# Patient Record
Sex: FEMALE | Race: WHITE | NOT HISPANIC OR LATINO | Employment: FULL TIME | URBAN - METROPOLITAN AREA
[De-identification: names, ages, dates, MRNs, and addresses within clinical notes are randomized per-mention and may not be internally consistent; named-entity substitution may affect disease eponyms.]

---

## 2017-12-29 ENCOUNTER — ALLSCRIPTS OFFICE VISIT (OUTPATIENT)
Dept: OTHER | Facility: OTHER | Age: 27
End: 2017-12-29

## 2017-12-30 NOTE — PROGRESS NOTES
Assessment   1  Disorder of right eustachian tube (381 9) (A47 67)    Plan   Disorder of right eustachian tube    · Claritin 10 MG Oral Tablet; TAKE 1 TABLET DAILY   · Fluticasone Propionate 50 MCG/ACT Nasal Suspension (Flonase Allergy Relief); USE 2 SPRAYS IN EACH NOSTRIL ONCE DAILY   · PredniSONE 10 MG Oral Tablet; 6-5-4-3-2-1 P  O  as directed   · Follow Up if Not Better Evaluation and Treatment  Follow-up  Status: Complete  Done:    [id number]   · Drink plenty of fluids ; Status:Complete;   Done: [id number]    Discussion/Summary      79-year-old white female is here with probable eustachian tube dysfunction  Of course of prednisone prescribed as well as recommendation to use over-the-counter Flonase and Claritin  She will call if symptoms do not resolve  The patient was counseled regarding instructions for management,-- impressions,-- importance of compliance with treatment  total time of encounter was 20 minutes  Chief Complaint   right ear pain - has been ongoing for 2 weeks now with crackling and yesterday it just got worse and her equilibrium is just off      History of Present Illness   Ear Pain: Mitesh Bellamy presents with complaints of ear pain  Associated symptoms include otalgia-- and-- loss of balance, but-- no ear drainage,-- no fever,-- no nasal congestion,-- no sore throat,-- no swollen glands,-- no headache-- and-- no tinnitus  The patient presents with complaints of gradual onset of frequent episodes of mild ear pressure  Episodes started about 2 weeks ago  She is currently experiencing ear pressure  Symptoms are unchanged  HPI: This is a 79-year-old female who works with as a   She has had ear pressure for about 2 weeks  She does have a life Malika tree at home  She does not have any sneezing or overall bad congestion or even rhinorrhea  She has not tried anything over-the-counter  There is no ringing in the ears  She does feel off balance        Review of Systems        Constitutional: not feeling tired  ENT: as noted in HPI  Cardiovascular: no chest pain  Respiratory: no shortness of breath-- and-- no cough  Gastrointestinal: no complaints of abdominal pain, no constipation, no nausea or diarrhea, no vomiting, no bloody stools  Genitourinary: no incontinence  Musculoskeletal: no complaints of arthralgia, no myalgia, no joint swelling or stiffness, no limb pain or swelling  Integumentary: no rashes  Neurological: No real vertigo but sense of balance issue, but-- as noted in HPI  ROS reviewed  Active Problems   1  Chronic allergic conjunctivitis (372 14) (H10 45)   2  Classic migraine with aura (346 00) (G43 109)   3  Decreased libido (799 81) (R68 82)   4  Fatigue (780 79) (R53 83)   5  History of allergy (V15 09) (Z88 9)   6  Muscle Cramps In The Thigh (Upper Leg) (729 82)   7  Pain During [place]   8  Simple goiter (240 0) (E04 0)    Past Medical History   Active Problems And Past Medical History Reviewed: The active problems and past medical history were reviewed and updated today  Family History   Mother    1  Family history of Asthma (V17 5)  Father    2  Family history of Coronary artery disease with unstable angina pectoris, unspecified     vessel or lesion type, unspecified whether native or transplanted heart   3  Family history of Hypertension (V17 49)  Maternal Grandmother    4  Family history of Diabetes Mellitus (V18 0)  Maternal Grandfather    5  Family history of Cancer  Family History Reviewed: The family history was reviewed and updated today  Social History    · Denied: History of Alcohol Use (History)   · Being A Social Drinker   · Caffeine Use   · Never A Smoker  The social history was reviewed and updated today  Surgical History   1  History of Oral Surgery Tooth Extraction [place] Tooth  Surgical History Reviewed: The surgical history was reviewed and updated today  Current Meds      The medication list was reviewed and updated today  Allergies   1  No Known Drug Allergies  2  Latex    Vitals    Recorded: [id number] 09:29AM Recorded: [id number] 09:07AM   Temperature  00 7 F   Systolic 907    Diastolic 68    Height  5 ft 3 in   Weight  137 lb 4 oz   BMI Calculated  24 31   BSA Calculated  1 65     Physical Exam        Constitutional      General appearance: No acute distress, well appearing and well nourished  Eyes      Pupils and irises: Equal, round and reactive to light  Ears, Nose, Mouth, and Throat      Otoscopic examination: Abnormal   The right tympanic membrane was retracted-- and-- had a diminished light reflex  The left tympanic membrane was normal       Nasal mucosa, septum, and turbinates: Normal without edema or erythema  Oropharynx: Normal with no erythema, edema, exudate or lesions  Pulmonary      Auscultation of lungs: Clear to auscultation  Cardiovascular      Auscultation of heart: Normal rate and rhythm, normal S1 and S2, without murmurs  Abdomen      Abdomen: Non-tender, no masses  Musculoskeletal      Gait and station: Normal        Neurologic      Sensation: No sensory loss         Psychiatric      Orientation to person, place, and time: Normal        Mood and affect: Normal           Signatures    Electronically signed by : Reynaldo Milian DO; Dec 29 0299 10:03AM EST                       (Author)

## 2018-01-23 VITALS
BODY MASS INDEX: 24.32 KG/M2 | SYSTOLIC BLOOD PRESSURE: 100 MMHG | HEIGHT: 63 IN | TEMPERATURE: 97.6 F | DIASTOLIC BLOOD PRESSURE: 68 MMHG | WEIGHT: 137.25 LBS

## 2019-06-10 ENCOUNTER — HOSPITAL ENCOUNTER (EMERGENCY)
Facility: HOSPITAL | Age: 29
Discharge: HOME | End: 2019-06-10
Admitting: EMERGENCY MEDICINE
Payer: OTHER MISCELLANEOUS

## 2019-06-10 ENCOUNTER — APPOINTMENT (EMERGENCY)
Dept: RADIOLOGY | Facility: HOSPITAL | Age: 29
End: 2019-06-10
Payer: OTHER MISCELLANEOUS

## 2019-06-10 VITALS
RESPIRATION RATE: 16 BRPM | TEMPERATURE: 98.8 F | WEIGHT: 143.3 LBS | SYSTOLIC BLOOD PRESSURE: 126 MMHG | DIASTOLIC BLOOD PRESSURE: 76 MMHG | BODY MASS INDEX: 25.38 KG/M2 | OXYGEN SATURATION: 100 % | HEART RATE: 80 BPM

## 2019-06-10 DIAGNOSIS — Z23 NEED FOR PROPHYLACTIC VACCINATION AGAINST RABIES: Primary | ICD-10-CM

## 2019-06-10 PROCEDURE — 73130 X-RAY EXAM OF HAND: CPT

## 2019-06-10 PROCEDURE — 90675 RABIES VACCINE IM: CPT | Performed by: EMERGENCY MEDICINE

## 2019-06-10 PROCEDURE — 90471 IMMUNIZATION ADMIN: CPT

## 2019-06-10 PROCEDURE — 99282 EMERGENCY DEPT VISIT SF MDM: CPT

## 2019-06-10 PROCEDURE — 99282 EMERGENCY DEPT VISIT SF MDM: CPT | Performed by: EMERGENCY MEDICINE

## 2019-06-10 RX ADMIN — RABIES VIRUS STRAIN PM-1503-3M ANTIGEN (PROPIOLACTONE INACTIVATED) AND WATER 1 ML: KIT at 20:38

## 2019-06-13 ENCOUNTER — HOSPITAL ENCOUNTER (EMERGENCY)
Facility: HOSPITAL | Age: 29
Discharge: HOME | End: 2019-06-13
Attending: EMERGENCY MEDICINE | Admitting: EMERGENCY MEDICINE
Payer: OTHER MISCELLANEOUS

## 2019-06-13 VITALS
WEIGHT: 140.4 LBS | SYSTOLIC BLOOD PRESSURE: 131 MMHG | DIASTOLIC BLOOD PRESSURE: 79 MMHG | HEART RATE: 75 BPM | RESPIRATION RATE: 16 BRPM | TEMPERATURE: 97.9 F | OXYGEN SATURATION: 100 % | BODY MASS INDEX: 24.87 KG/M2

## 2019-06-13 DIAGNOSIS — Z23 ENCOUNTER FOR REPEAT ADMINISTRATION OF RABIES VACCINATION: Primary | ICD-10-CM

## 2019-06-13 PROCEDURE — 90675 RABIES VACCINE IM: CPT | Performed by: PHYSICIAN ASSISTANT

## 2019-06-13 PROCEDURE — 99282 EMERGENCY DEPT VISIT SF MDM: CPT | Performed by: PHYSICIAN ASSISTANT

## 2019-06-13 PROCEDURE — 90471 IMMUNIZATION ADMIN: CPT

## 2019-06-13 RX ADMIN — Medication 1 ML: at 21:17

## 2020-08-05 ENCOUNTER — ANNUAL EXAM (OUTPATIENT)
Dept: FAMILY MEDICINE CLINIC | Facility: CLINIC | Age: 30
End: 2020-08-05
Payer: COMMERCIAL

## 2020-08-05 VITALS
DIASTOLIC BLOOD PRESSURE: 68 MMHG | HEART RATE: 78 BPM | SYSTOLIC BLOOD PRESSURE: 112 MMHG | HEIGHT: 64 IN | RESPIRATION RATE: 16 BRPM | WEIGHT: 137.2 LBS | TEMPERATURE: 98.1 F | OXYGEN SATURATION: 98 % | BODY MASS INDEX: 23.42 KG/M2

## 2020-08-05 DIAGNOSIS — Z00.00 HEALTH CARE MAINTENANCE: Primary | ICD-10-CM

## 2020-08-05 DIAGNOSIS — Z01.419 ENCOUNTER FOR GYNECOLOGICAL EXAMINATION WITH PAPANICOLAOU SMEAR OF CERVIX: ICD-10-CM

## 2020-08-05 PROCEDURE — S0612 ANNUAL GYNECOLOGICAL EXAMINA: HCPCS | Performed by: FAMILY MEDICINE

## 2020-08-05 PROCEDURE — G0145 SCR C/V CYTO,THINLAYER,RESCR: HCPCS | Performed by: FAMILY MEDICINE

## 2020-08-05 PROCEDURE — 3725F SCREEN DEPRESSION PERFORMED: CPT | Performed by: FAMILY MEDICINE

## 2020-08-05 PROCEDURE — 99395 PREV VISIT EST AGE 18-39: CPT | Performed by: FAMILY MEDICINE

## 2020-08-05 PROCEDURE — 1036F TOBACCO NON-USER: CPT | Performed by: FAMILY MEDICINE

## 2020-08-05 PROCEDURE — 3008F BODY MASS INDEX DOCD: CPT | Performed by: FAMILY MEDICINE

## 2020-08-05 RX ORDER — CETIRIZINE HYDROCHLORIDE 10 MG/1
10 TABLET ORAL DAILY
COMMUNITY
End: 2022-07-02

## 2020-08-05 RX ORDER — GLUCOSAMINE/METHYLSULFONYLMETH 500-400 MG
1 CAPSULE ORAL DAILY
COMMUNITY
End: 2021-05-13

## 2020-08-05 RX ORDER — FLUTICASONE PROPIONATE 50 MCG
2 SPRAY, SUSPENSION (ML) NASAL DAILY
COMMUNITY
Start: 2013-04-17 | End: 2022-07-02

## 2020-08-05 NOTE — PROGRESS NOTES
Assessment/Plan:     Diagnoses and all orders for this visit:    Health care maintenance    Encounter for gynecological examination with Papanicolaou smear of cervix  -     Liquid-based pap, screening; Future  -     Liquid-based pap, screening    Other orders  -     fluticasone (FLONASE) 50 mcg/act nasal spray; 2 sprays into each nostril daily  -     cetirizine (ZyrTEC) 10 mg tablet; Take 10 mg by mouth daily  -     Misc Natural Products (Turmeric Curcumin) CAPS; Take 1 capsule by mouth daily  -     Ginkgo Biloba (GINKOBA PO); Take 120 mg by mouth daily  -     Echinacea 450 MG CAPS; Take 1 capsule by mouth daily        To continue annual or biannual physicals  Pap obtained and will review results  Continue efforts at fitness and dietary compliance  Subjective:   Chief Complaint   Patient presents with    Gynecologic Exam     annual pap       Patient ID: Georges Aragon is a 31 y o  female  Patient is pleasant 24-year-old female who is currently in a long-term relationship and is using condoms for birth control  She is having normal menstrual and without complaints  She is working full-time as a certified   The following portions of the patient's history were reviewed and updated as appropriate: allergies, current medications, past family history, past medical history, past social history, past surgical history and problem list       Review of Systems   All other systems reviewed and are negative  Objective:  OB History             Para   0    Term                AB        Living           SAB        TAB        Ectopic        Multiple        Live Births                 menarche =13   Regular  Painful and heavy flow  /68   Pulse 78   Temp 98 1 °F (36 7 °C) (Temporal)   Resp 16   Ht 5' 4" (1 626 m)   Wt 62 2 kg (137 lb 3 2 oz)   SpO2 98%   BMI 23 55 kg/m²          Physical Exam  Vitals signs reviewed     Constitutional:       Appearance: Normal appearance  She is well-developed and normal weight  HENT:      Head: Normocephalic  Eyes:      Pupils: Pupils are equal, round, and reactive to light  Neck:      Musculoskeletal: Normal range of motion  Thyroid: No thyromegaly  Cardiovascular:      Rate and Rhythm: Normal rate and regular rhythm  Heart sounds: Normal heart sounds  No murmur  Pulmonary:      Effort: Pulmonary effort is normal  No respiratory distress  Abdominal:      General: Bowel sounds are normal       Palpations: Abdomen is soft  There is no mass  Tenderness: There is no abdominal tenderness  Hernia: There is no hernia in the left inguinal area  Genitourinary:     General: Normal vulva  Luis Fernando stage (genital): 5  Labia:         Right: No rash, tenderness or lesion  Left: No rash, tenderness or lesion  Vagina: Normal       Cervix: No cervical motion tenderness, discharge or friability  Adnexa:         Right: No mass, tenderness or fullness  Left: No mass, tenderness or fullness  Musculoskeletal: Normal range of motion  Skin:     General: Skin is warm and dry  Neurological:      Mental Status: She is alert and oriented to person, place, and time  Motor: No abnormal muscle tone  Deep Tendon Reflexes: Reflexes normal    Psychiatric:         Mood and Affect: Mood normal          Behavior: Behavior normal          Thought Content:  Thought content normal          Judgment: Judgment normal

## 2020-08-12 LAB
LAB AP GYN PRIMARY INTERPRETATION: NORMAL
LAB AP LMP: NORMAL
Lab: NORMAL

## 2021-05-13 ENCOUNTER — OFFICE VISIT (OUTPATIENT)
Dept: FAMILY MEDICINE CLINIC | Facility: CLINIC | Age: 31
End: 2021-05-13
Payer: COMMERCIAL

## 2021-05-13 VITALS
TEMPERATURE: 98.2 F | SYSTOLIC BLOOD PRESSURE: 102 MMHG | WEIGHT: 135.4 LBS | HEART RATE: 64 BPM | RESPIRATION RATE: 16 BRPM | DIASTOLIC BLOOD PRESSURE: 62 MMHG | BODY MASS INDEX: 23.12 KG/M2 | OXYGEN SATURATION: 98 % | HEIGHT: 64 IN

## 2021-05-13 DIAGNOSIS — Z31.69 PRE-CONCEPTION COUNSELING: Primary | ICD-10-CM

## 2021-05-13 PROCEDURE — 1036F TOBACCO NON-USER: CPT | Performed by: NURSE PRACTITIONER

## 2021-05-13 PROCEDURE — 3725F SCREEN DEPRESSION PERFORMED: CPT | Performed by: NURSE PRACTITIONER

## 2021-05-13 PROCEDURE — 99214 OFFICE O/P EST MOD 30 MIN: CPT | Performed by: NURSE PRACTITIONER

## 2021-05-13 PROCEDURE — 3008F BODY MASS INDEX DOCD: CPT | Performed by: NURSE PRACTITIONER

## 2021-05-13 NOTE — PROGRESS NOTES
Assessment/Plan:   Diagnosis ICD-10-CM Associated Orders   1  Pre-conception counseling  Z31 69 Ambulatory referral to Obstetrics / Gynecology   Discussed use of pre celina vitamins/ diet/ exercise/ taking proper precautions at work  Eat a variety of healthy foods  Healthy foods include fruits, vegetables, whole-grain breads, low-fat dairy foods, beans, lean meats, and fish  Limit foods high in sugar, fat, and sodium  Limit your intake of fish to 2 servings each week  Choose fish low in mercury such as canned light tuna, shrimp, salmon, cod, or tilapia  Do not  eat fish high in mercury such as swordfish, tilefish, naty mackerel, and shark  Take 400 micrograms (mcg) of folic acid each day  This will help to prevent birth defects of the brain and spine such as spina bifida  Most women should take folic acid before pregnancy and up to 12 weeks after getting pregnant  Exercise for at least 30 minutes, 5 days a week  Some examples of exercise include walking, biking, dancing, and swimming  Include muscle strengthening activities 2 days each week  Regular exercise provides many health benefits  It helps you manage your weight, and decreases your risk for type 2 diabetes, heart disease, stroke, and high blood pressure  Exercise can also help improve your mood  Ask your healthcare provider about the best exercise plan for you  No smoking/ drugs/ alcohol use  Discussed genetic testing  No hx of genetic disorders  All questions answered  Advised to call the office for any worsening of symptoms or no symptom improvement  Patient verbalizes understand and agrees with treatment plan  Diagnoses and all orders for this visit:    Pre-conception counseling  -     Ambulatory referral to Obstetrics / Gynecology; Future                Subjective:        Patient ID: Arabella White is a 36 y o  female  Chief Complaint   Patient presents with    Follow-up     "Conception"       Here to discuss preconception counseling   She is considering starting to try to conceive in August 201  Currently using condoms for contraception since 2016  She was on OC in the past, but this was years ago  She is scheduled for covid vaccine today  She is looking for some advice / guidance on what she should do to prepare for this  She has questions  The following portions of the patient's history were reviewed and updated as appropriate: allergies, current medications, past family history, past social history and problem list     Review of Systems   Constitutional: Negative for chills and fever  Eyes: Negative for discharge  Respiratory: Negative for shortness of breath  Cardiovascular: Negative for chest pain  Gastrointestinal: Negative for constipation and diarrhea  Genitourinary: Negative for difficulty urinating  Musculoskeletal: Negative for joint swelling  Skin: Negative for rash  Neurological: Negative for headaches  Hematological: Negative for adenopathy  Psychiatric/Behavioral: The patient is not nervous/anxious  Objective:  /62 (BP Location: Left arm, Patient Position: Sitting, Cuff Size: Standard)   Pulse 64   Temp 98 2 °F (36 8 °C) (Temporal)   Resp 16   Ht 5' 4" (1 626 m)   Wt 61 4 kg (135 lb 6 4 oz)   SpO2 98%   BMI 23 24 kg/m²      Physical Exam  Vitals signs and nursing note reviewed  Constitutional:       General: She is not in acute distress  Appearance: She is well-developed  She is not diaphoretic  HENT:      Head: Normocephalic and atraumatic  Right Ear: External ear normal       Left Ear: External ear normal    Eyes:      General: Lids are normal          Right eye: No discharge  Left eye: No discharge  Conjunctiva/sclera: Conjunctivae normal    Neck:      Musculoskeletal: Neck supple  Cardiovascular:      Rate and Rhythm: Normal rate and regular rhythm  Heart sounds: No murmur     Pulmonary:      Effort: Pulmonary effort is normal  No respiratory distress  Breath sounds: Normal breath sounds  No wheezing  Musculoskeletal:         General: No deformity  Skin:     General: Skin is warm and dry  Neurological:      Mental Status: She is alert and oriented to person, place, and time  Psychiatric:         Speech: Speech normal          Behavior: Behavior normal          Thought Content:  Thought content normal          Judgment: Judgment normal                 Current Outpatient Medications:     cetirizine (ZyrTEC) 10 mg tablet, Take 10 mg by mouth daily, Disp: , Rfl:     fluticasone (FLONASE) 50 mcg/act nasal spray, 2 sprays into each nostril daily, Disp: , Rfl:     Misc Natural Products (Turmeric Curcumin) CAPS, Take 1 capsule by mouth daily, Disp: , Rfl:   Allergies   Allergen Reactions    Latex Hives

## 2021-05-13 NOTE — PATIENT INSTRUCTIONS
Planning for Pregnancy   AMBULATORY CARE:   Why you should plan for pregnancy:  There are things you can do to get your body ready for a healthy pregnancy  A healthy pregnancy can improve your chance of having a healthy baby  The steps you need to take and the amount of time needed depends on your current health and habits  Work with your healthcare provider to help you plan a healthy pregnancy  What you need to know about nutrition and exercise before pregnancy:   · Eat a variety of healthy foods  Healthy foods include fruits, vegetables, whole-grain breads, low-fat dairy foods, beans, lean meats, and fish  Limit foods high in sugar, fat, and sodium  Limit your intake of fish to 2 servings each week  Choose fish low in mercury such as canned light tuna, shrimp, salmon, cod, or tilapia  Do not  eat fish high in mercury such as swordfish, tilefish, naty mackerel, and shark  · Take 400 micrograms (mcg) of folic acid each day  This will help to prevent birth defects of the brain and spine such as spina bifida  Most women should take folic acid before pregnancy and up to 12 weeks after getting pregnant  · Exercise for at least 30 minutes, 5 days a week  Some examples of exercise include walking, biking, dancing, and swimming  Include muscle strengthening activities 2 days each week  Regular exercise provides many health benefits  It helps you manage your weight, and decreases your risk for type 2 diabetes, heart disease, stroke, and high blood pressure  Exercise can also help improve your mood  Ask your healthcare provider about the best exercise plan for you  How weight affects pregnancy:   · Obesity  can make it harder for you to get pregnant  It also increases your risk of health problems during pregnancy  Some of these health problems include gestational diabetes, high blood pressure, and infections  It can also increase your baby's risk of health problems such as birth defects   Your baby may also be large and harder to deliver or be born prematurely (early)  Your risk of miscarriage is also higher if you are obese  Work with your healthcare provider to reach a healthy weight before you try to get pregnant  · Being underweight  can also make it hard for you to get pregnant  It can also increase your risk of having a premature baby and miscarriage  Your baby may be born at a low birth weight  What you need to know about smoking, alcohol, and drugs:   · Smoking  increases your risk of a miscarriage and other health problems during pregnancy  Smoking can cause your baby to be born too early or weigh less at birth  Ask your healthcare provider for information if you need help quitting  · Alcohol  passes from your body to your baby through the placenta  It can affect your baby's brain development and cause fetal alcohol syndrome (FAS)  FAS is a group of conditions that causes mental, behavior, and growth problems  Talk to your healthcare provider if you abuse alcohol and need help quitting before pregnancy  · Drugs , such as marijuana and cocaine, should not be used while you are trying to get pregnant or during pregnancy  They increase your risk of problems during pregnancy and increase the risk of having a baby with health problems  These include birth defects, premature birth, and infant death  What you need to know about medicines and supplements:  Tell your healthcare provider about all the medicines and supplements you take  Certain medicines and supplements should not be used during pregnancy  These include over-the-counter medicines, prescription medicines, vitamins, and herbal supplements  He or she may recommend that you take different medicines that are safer during pregnancy  What you need to know about immunizations:  Tell your healthcare provider about all the immunizations you have had   If you have missed any immunizations, your healthcare provider may recommend that you update your immunizations  These include hepatitis B, influenza, MMR (measles, mumps, rubella), Tdap, and varicella immunizations  Tests you may need to have before pregnancy:  Your healthcare provider may recommend that you have tests to screen for sexually transmitted infections  These include chlamydia, gonorrhea, herpes, HIV infection, syphilis, and tuberculosis  These infectious diseases should be treated before pregnancy, if needed  What you need to know about toxic substances:  Toxic substances can harm a developing baby  Examples include cleaning products, paints, solvents, pesticides, and other chemical products  They can increase the risk of having a miscarriage, premature birth, and low-birth weight baby  They also increase the risk of developmental delay and childhood cancer  Avoid exposure to toxic substances and materials at work and home  What you need to know about genetic testing:  Tell your healthcare provider about your and your partner's family history of genetic disorders, developmental delays, or other disabilities  Also tell your healthcare provider about any problems you have had in previous pregnancies  Your healthcare provider may recommend that you see a healthcare professional called a genetic counselor  He or she will talk to you about how genes, birth defects, and other medical conditions are passed down  He or she can also tell you about your risk for passing a genetic disease in a future pregnancy  How you can prepare for pregnancy if you have a medical condition:  Medical conditions such as diabetes, high blood pressure, asthma, seizure disorders, and thyroid disorders should be managed before pregnancy  Mental health conditions, such as depression and anxiety, should also be treated  This will decrease your risk of having health problems during pregnancy  It will also decrease your baby's risk of medical problems   Medicines used to treat certain conditions are not safe to use during pregnancy and may need to be changed before you get pregnant  Ask your healthcare provider if it is safe for you to get pregnant if you have a medical condition  © Copyright [address] Information is for End User's use only and may not be sold, redistributed or otherwise used for commercial purposes  All illustrations and images included in CareNotes® are the copyrighted property of A D A M , Inc  or [facility] [employer]ett   The above information is an  only  It is not intended as medical advice for individual conditions or treatments  Talk to your doctor, nurse or pharmacist before following any medical regimen to see if it is safe and effective for you

## 2021-09-28 ENCOUNTER — TELEPHONE (OUTPATIENT)
Dept: FAMILY MEDICINE CLINIC | Facility: CLINIC | Age: 31
End: 2021-09-28

## 2021-09-28 ENCOUNTER — TELEPHONE (OUTPATIENT)
Dept: OBGYN CLINIC | Facility: MEDICAL CENTER | Age: 31
End: 2021-09-28

## 2021-09-28 DIAGNOSIS — N91.2 AMENORRHEA: Primary | ICD-10-CM

## 2021-09-28 NOTE — TELEPHONE ENCOUNTER
Patient called about being newly pregnant her last lmp was on 08/25 and will be going to a [employer] to get her blood work done   Please review

## 2021-09-28 NOTE — TELEPHONE ENCOUNTER
Congratulations! She should follow up with OB to make an appointment  They will do an intake  They typically will first see her around week 8-12   If she has questions/ wants to talk prior she can absolutely come here too in the mean time

## 2021-09-28 NOTE — TELEPHONE ENCOUNTER
Patient called stating she did a home pregnancy test and it showed that she was pregnant, do you want her to follow up with you or an OB/GYN  Please advise patient at [phone]

## 2021-09-30 ENCOUNTER — APPOINTMENT (OUTPATIENT)
Dept: LAB | Facility: CLINIC | Age: 31
End: 2021-09-30
Payer: COMMERCIAL

## 2021-09-30 ENCOUNTER — TELEPHONE (OUTPATIENT)
Dept: OBGYN CLINIC | Facility: MEDICAL CENTER | Age: 31
End: 2021-09-30

## 2021-09-30 DIAGNOSIS — N91.2 AMENORRHEA: ICD-10-CM

## 2021-09-30 LAB
ABO GROUP BLD: NORMAL
B-HCG SERPL-ACNC: 4 MIU/ML
BLD GP AB SCN SERPL QL: NEGATIVE
PROGEST SERPL-MCNC: 1.4 NG/ML
RH BLD: POSITIVE
SPECIMEN EXPIRATION DATE: NORMAL

## 2021-09-30 PROCEDURE — 36415 COLL VENOUS BLD VENIPUNCTURE: CPT

## 2021-09-30 PROCEDURE — 86901 BLOOD TYPING SEROLOGIC RH(D): CPT

## 2021-09-30 PROCEDURE — 84144 ASSAY OF PROGESTERONE: CPT

## 2021-09-30 PROCEDURE — 84702 CHORIONIC GONADOTROPIN TEST: CPT

## 2021-09-30 PROCEDURE — 86900 BLOOD TYPING SEROLOGIC ABO: CPT

## 2021-09-30 PROCEDURE — 86850 RBC ANTIBODY SCREEN: CPT

## 2021-09-30 NOTE — TELEPHONE ENCOUNTER
Patient called states she is 5 wks pregnant LMP 8/25/21   Started having some spotting and cramping yesterday  Today some light bleeding using a panty liner but no pain or cramping  Patient already had lab entered in chart to have done @ 6 wks  Discussed with Raf Cherry   Patient should go to lab and have HCG quant done now and we we call her after we review labs  Patient to call if she has increase in bleeding or pain

## 2021-09-30 NOTE — TELEPHONE ENCOUNTER
Hasn't yet done her labs but started spotting yesterday morning but it became more heavier since last night  Along with some cramping  Is 5 wks

## 2021-11-08 ENCOUNTER — TELEPHONE (OUTPATIENT)
Dept: OBGYN CLINIC | Facility: MEDICAL CENTER | Age: 31
End: 2021-11-08

## 2021-11-08 DIAGNOSIS — N92.6 MISSED MENSES: Primary | ICD-10-CM

## 2021-11-10 ENCOUNTER — LAB (OUTPATIENT)
Dept: LAB | Facility: CLINIC | Age: 31
End: 2021-11-10
Payer: COMMERCIAL

## 2021-11-10 DIAGNOSIS — N92.6 MISSED MENSES: ICD-10-CM

## 2021-11-10 LAB
B-HCG SERPL-ACNC: ABNORMAL MIU/ML
PROGEST SERPL-MCNC: 21.7 NG/ML

## 2021-11-10 PROCEDURE — 36415 COLL VENOUS BLD VENIPUNCTURE: CPT

## 2021-11-10 PROCEDURE — 84144 ASSAY OF PROGESTERONE: CPT

## 2021-11-10 PROCEDURE — 84702 CHORIONIC GONADOTROPIN TEST: CPT

## 2021-12-01 ENCOUNTER — ULTRASOUND (OUTPATIENT)
Dept: OBGYN CLINIC | Facility: MEDICAL CENTER | Age: 31
End: 2021-12-01
Payer: COMMERCIAL

## 2021-12-01 VITALS
DIASTOLIC BLOOD PRESSURE: 70 MMHG | HEIGHT: 64 IN | SYSTOLIC BLOOD PRESSURE: 120 MMHG | WEIGHT: 139 LBS | BODY MASS INDEX: 23.73 KG/M2

## 2021-12-01 DIAGNOSIS — O36.80X0 PREGNANCY WITH UNCERTAIN FETAL VIABILITY, SINGLE OR UNSPECIFIED FETUS: Primary | ICD-10-CM

## 2021-12-01 PROCEDURE — 99204 OFFICE O/P NEW MOD 45 MIN: CPT | Performed by: STUDENT IN AN ORGANIZED HEALTH CARE EDUCATION/TRAINING PROGRAM

## 2021-12-01 PROCEDURE — 1036F TOBACCO NON-USER: CPT | Performed by: STUDENT IN AN ORGANIZED HEALTH CARE EDUCATION/TRAINING PROGRAM

## 2021-12-01 PROCEDURE — 76801 OB US < 14 WKS SINGLE FETUS: CPT | Performed by: STUDENT IN AN ORGANIZED HEALTH CARE EDUCATION/TRAINING PROGRAM

## 2021-12-21 ENCOUNTER — TELEPHONE (OUTPATIENT)
Dept: PERINATAL CARE | Facility: CLINIC | Age: 31
End: 2021-12-21

## 2021-12-21 ENCOUNTER — INITIAL PRENATAL (OUTPATIENT)
Dept: OBGYN CLINIC | Facility: MEDICAL CENTER | Age: 31
End: 2021-12-21

## 2021-12-21 ENCOUNTER — APPOINTMENT (OUTPATIENT)
Dept: LAB | Facility: MEDICAL CENTER | Age: 31
End: 2021-12-21
Payer: COMMERCIAL

## 2021-12-21 VITALS
SYSTOLIC BLOOD PRESSURE: 100 MMHG | WEIGHT: 142.2 LBS | HEIGHT: 62 IN | BODY MASS INDEX: 26.17 KG/M2 | DIASTOLIC BLOOD PRESSURE: 58 MMHG

## 2021-12-21 DIAGNOSIS — Z34.01 ENCOUNTER FOR SUPERVISION OF NORMAL FIRST PREGNANCY IN FIRST TRIMESTER: Primary | ICD-10-CM

## 2021-12-21 DIAGNOSIS — Z34.01 ENCOUNTER FOR SUPERVISION OF NORMAL FIRST PREGNANCY IN FIRST TRIMESTER: ICD-10-CM

## 2021-12-21 LAB
ABO GROUP BLD: NORMAL
BASOPHILS # BLD AUTO: 0.02 THOUSANDS/ΜL (ref 0–0.1)
BASOPHILS NFR BLD AUTO: 0 % (ref 0–1)
BILIRUB UR QL STRIP: NEGATIVE
BLD GP AB SCN SERPL QL: NEGATIVE
CLARITY UR: NORMAL
COLOR UR: YELLOW
EOSINOPHIL # BLD AUTO: 0.06 THOUSAND/ΜL (ref 0–0.61)
EOSINOPHIL NFR BLD AUTO: 1 % (ref 0–6)
ERYTHROCYTE [DISTWIDTH] IN BLOOD BY AUTOMATED COUNT: 12.6 % (ref 11.6–15.1)
GLUCOSE UR STRIP-MCNC: NEGATIVE MG/DL
HBV SURFACE AG SER QL: NORMAL
HCT VFR BLD AUTO: 38.1 % (ref 34.8–46.1)
HGB BLD-MCNC: 12.7 G/DL (ref 11.5–15.4)
HGB UR QL STRIP.AUTO: NEGATIVE
IMM GRANULOCYTES # BLD AUTO: 0.02 THOUSAND/UL (ref 0–0.2)
IMM GRANULOCYTES NFR BLD AUTO: 0 % (ref 0–2)
KETONES UR STRIP-MCNC: NEGATIVE MG/DL
LEUKOCYTE ESTERASE UR QL STRIP: NEGATIVE
LYMPHOCYTES # BLD AUTO: 1.29 THOUSANDS/ΜL (ref 0.6–4.47)
LYMPHOCYTES NFR BLD AUTO: 22 % (ref 14–44)
MCH RBC QN AUTO: 30.6 PG (ref 26.8–34.3)
MCHC RBC AUTO-ENTMCNC: 33.3 G/DL (ref 31.4–37.4)
MCV RBC AUTO: 92 FL (ref 82–98)
MONOCYTES # BLD AUTO: 0.34 THOUSAND/ΜL (ref 0.17–1.22)
MONOCYTES NFR BLD AUTO: 6 % (ref 4–12)
NEUTROPHILS # BLD AUTO: 4.26 THOUSANDS/ΜL (ref 1.85–7.62)
NEUTS SEG NFR BLD AUTO: 71 % (ref 43–75)
NITRITE UR QL STRIP: NEGATIVE
NRBC BLD AUTO-RTO: 0 /100 WBCS
PH UR STRIP.AUTO: 6.5 [PH]
PLATELET # BLD AUTO: 143 THOUSANDS/UL (ref 149–390)
PMV BLD AUTO: 12.1 FL (ref 8.9–12.7)
PROT UR STRIP-MCNC: NEGATIVE MG/DL
RBC # BLD AUTO: 4.15 MILLION/UL (ref 3.81–5.12)
RH BLD: POSITIVE
RPR SER QL: NORMAL
RUBV IGG SERPL IA-ACNC: 137.5 IU/ML
SP GR UR STRIP.AUTO: 1.02 (ref 1–1.03)
SPECIMEN EXPIRATION DATE: NORMAL
UROBILINOGEN UR QL STRIP.AUTO: 1 E.U./DL
WBC # BLD AUTO: 5.99 THOUSAND/UL (ref 4.31–10.16)

## 2021-12-21 PROCEDURE — 81003 URINALYSIS AUTO W/O SCOPE: CPT

## 2021-12-21 PROCEDURE — 87086 URINE CULTURE/COLONY COUNT: CPT

## 2021-12-21 PROCEDURE — 3008F BODY MASS INDEX DOCD: CPT | Performed by: STUDENT IN AN ORGANIZED HEALTH CARE EDUCATION/TRAINING PROGRAM

## 2021-12-21 PROCEDURE — 80081 OBSTETRIC PANEL INC HIV TSTG: CPT

## 2021-12-21 PROCEDURE — OBC: Performed by: OBSTETRICS & GYNECOLOGY

## 2021-12-21 PROCEDURE — 36415 COLL VENOUS BLD VENIPUNCTURE: CPT

## 2021-12-22 LAB
BACTERIA UR CULT: NORMAL
HIV 1+2 AB+HIV1 P24 AG SERPL QL IA: NORMAL

## 2021-12-23 DIAGNOSIS — D69.6 THROMBOCYTOPENIA AFFECTING PREGNANCY (HCC): Primary | ICD-10-CM

## 2021-12-23 DIAGNOSIS — O99.119 THROMBOCYTOPENIA AFFECTING PREGNANCY (HCC): Primary | ICD-10-CM

## 2021-12-29 NOTE — PROGRESS NOTES
Prenatal H&P     Denies loss of fluid, vaginal discharge, vaginal bleeding  and abdominal pain  Confirms fetal movement, flutters  Tolerating PNV well  Prenatal panel reviewed significant for thrombocytopenia  Has referral to Hematology/Oncology  Referral printed and provided to patient  Patient encouraged to call to schedule appointment  Plans for genetic screening NIPT, appointment today  Planned  pregnancy  OB history:     G1- 8/2021 8 week SAB (chemical pregnancy)      G2- current     Dating:     LMP - 9/29/21 [facility] 7/6/22     US on 12/1/21 @  9w [facility] 7/6/22  Working [facility] 7/6/22    Surgical history:     [place] teeth removal    Medical History:     History of varicella    Social history:     Alcohol/ tobacco/ illicit drug -rare alcohol use prior to pregnancy/denies tobacco drug use     Denies history of STD/STI     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, 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     PCP/ Dental- last PE 1-2 years/ Last dental cleaning about 4 years ago     SBIRT- screen negative @ intake     She denies a hx of recent cough, chills, fever,  STD/STI, denies a personal history  of TB, COVID-19 and Zika virus or close contacts with persons with TB or COVID -23  She denies a family history of inheritable conditions such as physical or intellectual disabilities, birth defects, blood disorders, heart or neural tube defects  She has never had MRSA  She denies recent travel  Plans travel to [place] in March  Patient Plans   - Feeding- breast   - Contraception-condom  - [facility] office delivers [facility]  If < 32 weeks will recommend evaluation at [address] [place] [address] St:  - Continue prenatal vitamin daily  - Genetic screening plans NIPT/ [facility] appointment 1/3/22  - thrombocytopenia encouraged to schedule appointment with Hematology/Oncology  Referral printed and provided to patient  -  Weight gain in pregnancy- Who BMI recommend 15-25 lb weight gain in pregnancy    Healthy diet and daily exercise reviewed and encouraged  - Unit regimen reviewed visit every 4 weeks until 28 weeks, every 2 weeks until 36 weeks and then weekly until delivery  - Labs-gonorrhea/chlamydia collected today  Pap smear up-to-date  - Vaccines in Pregnancy      - TDAP vaccine after 27 gestational weeks     - Reviewed with patient  Pregnancy with COVID infection is considered  high risk and can have poor outcome including  delivery, more severe infection  therefore  pregnant patients are recommended to get  COVID-19 vaccination  Written information provided  Had vaccine x2  Plans to schedule booster     - influenza October- March -plans to receive vaccine, declines at today's visit due to work  -  Common discomforts of pregnancy and precautions reviewed  Signs and symptoms to report reviewed  Encouraged social distancing, good hand hygiene, masking, avoiding crowds and written information provided about COVID-19      RTO 4 weeks

## 2021-12-29 NOTE — PATIENT INSTRUCTIONS
Pregnancy at 15 to 18 Weeks   [address]:   What changes are happening in my body? Now that you are in your second trimester, you have more energy  You may also feel hungrier than usual  You may start to experience other symptoms, such as heartburn or dizziness  You may be gaining about ½ to 1 pound a week, and your pregnancy is beginning to show  You may need to start wearing maternity clothes  How do I care for myself at this stage of my pregnancy? · Manage heartburn  by eating 4 or 5 small meals each day instead of large meals  Avoid spicy foods  Avoid eating right before bedtime  · Manage nausea and vomiting  Avoid fatty and spicy foods  Eat small meals throughout the day instead of large meals  [place]rill may help to decrease nausea  Ask your healthcare provider about other ways of decreasing nausea and vomiting  · Eat a variety of healthy foods  Healthy foods include fruits, vegetables, whole-grain breads, low-fat dairy foods, beans, lean meats, and fish  Drink liquids as directed  Ask how much liquid to drink each day and which liquids are best for you  Limit caffeine to less than 200 milligrams each day  Limit your intake of fish to 2 servings each week  Choose fish low in mercury such as canned light tuna, shrimp, salmon, cod, or tilapia  Do not  eat fish high in mercury such as swordfish, tilefish, naty mackerel, and shark  · Take prenatal vitamins as directed  Your need for certain vitamins and minerals, such as folic acid, increases during pregnancy  Prenatal vitamins provide some of the extra vitamins and minerals you need  Prenatal vitamins may also help to decrease the risk of certain birth defects  · Do not smoke  Smoking increases your risk of a miscarriage and other health problems during your pregnancy  Smoking can cause your baby to be born too early or weigh less at birth   Ask your healthcare provider for information if you need help quitting  · Do not drink alcohol  Alcohol passes from your body to your baby through the placenta  It can affect your baby's brain development and cause fetal alcohol syndrome (FAS)  FAS is a group of conditions that causes mental, behavior, and growth problems  · Talk to your healthcare provider before you take any medicines  Many medicines may harm your baby if you take them when you are pregnant  Do not take any medicines, vitamins, herbs, or supplements without first talking to your healthcare provider  Never use illegal or street drugs (such as marijuana or cocaine) while you are pregnant  What are some safety tips during pregnancy? · Avoid hot tubs and saunas  Do not use a hot tub or sauna while you are pregnant, especially during your first trimester  Hot tubs and saunas may raise your baby's temperature and increase the risk of birth defects  · Avoid toxoplasmosis  This is an infection caused by eating raw meat or being around infected cat feces  It can cause birth defects, miscarriages, and other problems  Wash your hands after you touch raw meat  Make sure any meat is well-cooked before you eat it  Avoid raw eggs and unpasteurized milk  Use gloves or ask someone else to clean your cat's litter box while you are pregnant  What changes are happening with my baby? By 18 weeks, your baby may be about 6 inches long from the top of the head to the rump (baby's bottom)  Your baby may weigh about 11 ounces  You may be able to feel your baby's movement at about 18 weeks or later  The first movements may not be that noticeable  They may feel like a fluttering sensation  Your baby also makes sucking movements and can hear certain sounds  What do I need to know about prenatal care? During the first 28 weeks of your pregnancy, you will see your healthcare provider once a month  Your healthcare provider will check your blood pressure and weight   You may also need any of the following:  · A urine test  may also be done to check for sugar and protein  These can be signs of gestational diabetes or infection  · A blood test  may be done to check for anemia (low iron level)  · Fundal height check  is a measurement of your uterus to check your baby's growth  This number is usually the same as the number of weeks that you have been pregnant  · An ultrasound  may be done to check your baby's development  Your healthcare provider may be able to tell you what your baby's gender is during the ultrasound  · Your baby's heart rate  will be checked  When should I seek immediate care? · You have pain or cramping in your abdomen or low back  · You have heavy vaginal bleeding or clotting  · You pass material that looks like tissue or large clots  Collect the material and bring it with you  When should I call my doctor or obstetrician? · You cannot keep food or drinks down, and you are losing weight  · You have light bleeding  · You have chills or a fever  · You have vaginal itching, burning, or pain  · You have yellow, green, white, or foul-smelling vaginal discharge  · You have pain or burning when you urinate, less urine than usual, or pink or bloody urine  · You have questions or concerns about your condition or care  CARE AGREEMENT:   You have the right to help plan your care  Learn about your health condition and how it may be treated  Discuss treatment options with your healthcare providers to decide what care you want to receive  You always have the right to refuse treatment  The above information is an  only  It is not intended as medical advice for individual conditions or treatments  Talk to your doctor, nurse or pharmacist before following any medical regimen to see if it is safe and effective for you    © Copyright [employer] 2021 Information is for End User's use only and may not be sold, redistributed or otherwise used for commercial purposes  All illustrations and images included in CareNotes® are the copyrighted property of [employer]  or Yazmin Lester  COVID-19 and Pregnancy   AMBULATORY CARE:   What you need to know about coronavirus disease 2019 (COVID-19) and pregnancy:  Pregnancy increases your risk for severe COVID-19 illness  COVID-19 can also lead to  delivery of your baby  Most babies who become infected with the new virus do not develop serious effects, but some do  It is important for you and your baby to stay safe during pregnancy and delivery  Signs and symptoms of COVID-19 in newborns: The following signs and symptoms may be from COVID-19, but they are also common in newborns  Your 's healthcare provider may recommend testing to confirm or rule out COVID-19  Your  may need a second test if the first is negative  · Fever    · Not moving arms or legs much, or being too sleepy to feed    · A runny nose or cough    · Fast breathing, or trouble breathing    · Vomiting, diarrhea, or not feeding well    If you think you, your baby, or someone in your home may be infected:  Do the following to protect others:  · If emergency care is needed,  tell the  about the possible infection, or call ahead and tell the emergency department  · Call a healthcare provider  for instructions if symptoms are mild  Anyone who may be infected should not  arrive without calling first  The provider will need to protect staff members and other patients  · The person who may be infected needs to wear a face covering  while getting medical care  This will help lower the risk of infecting others  Coverings are not used for anyone who is younger than 2 years, has breathing problems, or cannot remove it  The provider can give you instructions for anyone who cannot wear a covering      Call your local emergency number [phone] in the [address]) or go to the emergency department if:   · You have trouble breathing or shortness of breath at rest     · You have chest pain or pressure that lasts longer than 5 minutes  · You become confused or hard to wake  · Your lips or face are blue  · You have a fever of 104°F (40°C) or higher  Call your doctor if:   · You have signs or symptoms of COVID-19  Try to call within 24 hours of when you start to feel sick  · You do not  have symptoms of COVID-19 but had close physical contact within 14 days with someone who tested positive  · You have questions or concerns about your condition or care  How the 2019 coronavirus spreads: The virus spreads quickly and easily  The virus can be passed starting 2 days before symptoms begin or before a positive test if symptoms never begin  The following are ways the virus is thought to spread, but more information may be coming:  · Droplets are the main way all coronaviruses spread  The virus travels in droplets that form when a person talks, coughs, or sneezes  The droplets can also float in the air for minutes or hours  Infection happens when you breathe in the droplets or get them in your eyes or nose  Close personal contact with an infected person increases your risk for infection  This means being within 6 feet (2 meters) of the person for at least 15 minutes over 24 hours  · Person-to-person contact can spread the virus  For example, a person with the virus on his or her hands can spread it by shaking hands with someone  · The virus can stay on objects and surfaces for a short time  You may become infected by touching the object or surface and then touching your eyes or mouth  · An infected animal may be able to infect a person who touches it  This may happen at live markets or on a farm  Protect yourself and your baby while you are pregnant: If you have COVID-19 during your pregnancy, healthcare providers will monitor you and your baby closely  Work with your healthcare provider or obstetrician   If you do not have either, experts recommend you contact a local community health center or health department  The best way to prevent infection is to avoid anyone who is infected, but this can be hard to do  An infected person can spread the virus before signs or symptoms develop, or even if signs or symptoms never develop  The following can help keep you and your baby safe:     · Wash your hands throughout the day  Use soap and water  Rub your soapy hands together, lacing your fingers  Wash the front and back of each hand, and in between your fingers  Use the fingers of one hand to scrub under the fingernails of the other hand  Wash for at least 20 seconds  Rinse with warm, running water for several seconds  Dry your hands with a clean towel or paper towel  Use hand  that contains alcohol if soap and water are not available  If you must go out, wash your hands before you leave your home and when you get home  Wash your hands after you put items away  Be careful about what you touch while you are out  · Protect yourself from sneezes and coughs  Turn your face away and cover your mouth and nose if you are around someone who is sneezing or coughing  This helps protect you from the person's droplets  Cover your mouth and nose with a tissue when you need to sneeze or cough  Use the bend of your arm if you do not have a tissue  Throw the tissue away  Then wash your hands or use hand   · Make a habit of not touching your face  If you get the virus on your hands, you can transfer it to your eyes, nose, or mouth and become infected  · Follow worldwide, national, and local social distancing guidelines  Social distancing means staying far enough away physically from others that the virus cannot spread from one person to another  If you must go out, avoid crowds and large gatherings  Gatherings or crowds of 10 or more individuals can cause the virus to spread   Avoid places such as [place], beaches, sporting events, and tourist attractions  For events such as parties, holiday meals, Latter-day services, and conferences, attend virtually (on a computer), if possible  · Wear a face covering (mask) around anyone who does not live in your home  A covering helps protect the person wearing it from being infected or passing the virus to others  Do not  wear a plastic face shield instead of a covering  You can use both together for extra protection  Use a disposable non-medical mask, or make a cloth covering with at least 2 layers  You can also create layers by putting a cloth covering over a disposable non-medical mask  Cover your mouth and your nose  Securely fasten it under your chin and on the sides of your face  A face covering is not a substitute for other safety measures  Continue social distancing and washing your hands often  Do not put a face shield or covering on your   These increase the risk for sudden infant death syndrome (SIDS)  · Stay at least 6 feet (2 meters) away from anyone who does not live in your home  Keep this distance every time you go out of your home and are around another person  Do not shake hands with, hug, or kiss a person as a greeting  Stand or walk as far from others as possible, especially around anyone who is sneezing or coughing  If you must use public transportation (such as a bus or subway), try to sit or stand away from others  Do not go to someone else's home unless it is necessary  Do not go over to visit, even if you are lonely, or the person is  Only go if you need to help him or her  · Stay safe if you must go out to work  Keep physical distance between you and other workers as much as possible  Follow your employer's rules so everyone stays safe  · Clean and disinfect high-touch surfaces and objects in your home often  Use disinfecting wipes or make a solution of 4 teaspoons of bleach in 1 quart (4 cups) of water   Clean surfaces and objects in the room where your baby will be sleeping, especially right before you give birth  Wash your hands after you clean and disinfect  Be careful with cleaning products  Read the labels to make sure they are safe to use during pregnancy  Open windows to make sure you have good ventilation  What you can do to have a healthy pregnancy during the COVID-19 outbreak:       · Keep all prenatal and  appointments  You may be able to have certain prenatal appointments without having to go into the provider's office  Some providers offer phone, video, or other types of appointments  You may also be able to get prescriptions for a few months at a time  This will help lower the number of trips you need to make to the pharmacy for refills  If you do need to go into your provider's office, take precautions  Put a face covering on before you go into the office  Do not stand or sit within 6 feet (2 meters) of anyone in the waiting room, if possible  Do not stand or sit near anyone who is not wearing a face covering  · Get recommended vaccines  Your healthcare provider can tell you if you need vaccines not listed below, and when to get them  ? COVID-19 vaccines are recommended for use during pregnancy  Talk to your obstetrician or doctor about the risks and benefits of getting a COVID-19 vaccine during pregnancy  The current vaccines are given as a shot in 1 or 2 doses  A third dose is recommended for adults with a weakened immune system who get a 2-dose vaccine  The third dose is given at least 28 days after the second  Even after you get the vaccine, continue wearing a face covering, handwashing, and social distancing  These are still the best ways to prevent infection  ? Get the influenza (flu) vaccine  Try to get the vaccine as soon as recommended, usually starting in September or October  ? Get the Tdap vaccine  The Tdap vaccine protects you from tetanus, diphtheria, and pertussis   If possible, get the vaccine during weeks 27 to 39 of your pregnancy  You should get a dose of Tdap with each pregnancy  · Take prenatal vitamins as directed  Your prenatal vitamins should contain folic acid  You need about 600 micrograms (mcg) of folic acid each day during pregnancy  Folic acid helps to form your baby's brain and spinal cord in early pregnancy  · Eat a variety of healthy foods  Healthy foods are important, even if you take a prenatal vitamin  Healthy foods contain nutrients that help keep your immune system strong  Examples of healthy foods include vegetables, fruits, whole-grain breads and cereals, lean meats and poultry, fish, low-fat dairy products, and cooked beans  Do not have raw, undercooked, or unpasteurized food or drinks  Unpasteurized foods are foods that have not gone through the heating process (pasteurization) that destroys bacteria  Your healthcare provider or a dietitian can help you create healthy meal plans  · Talk to your healthcare provider about exercise  Moderate exercise can help keep your immune system strong  Your healthcare provider can help you plan an exercise program that is safe for you during pregnancy  You may need to exercise at home if you cannot exercise outdoors, such as walking in a park  If you want to do pregnancy yoga or other group activities, be safe  Stay at least 6 feet (2 meters) away from others in the class, and the instructor  Wash your hands before you leave the building  Follow the facility's instructions for preventing infections  · Try to lower your stress  You may be feeling more stressed than usual because of the COVID-19 outbreak  You may also feel stress from not being able to share your pregnancy with others  For example, you may not be able to have someone with you during prenatal visits or ultrasounds  Talk to your healthcare providers about ways to manage stress during this time  Pick 1 or 2 times a day to watch the news   Constant news watching about COVID-19 can increase your stress levels  Set a sleep schedule to go to bed and wake up at the same times each day  · Do not smoke cigarettes, drink alcohol, or use drugs  Nicotine and other chemicals in cigarettes and cigars can harm your baby and your health  Alcohol can increase your risk for a miscarriage  Your baby may also be born too small or have other health problems  Certain drugs can be passed to your baby before he or she is born  Some can be passed through breast milk  It is best to quit cigarettes, alcohol, and drugs before you become pregnant and not start again after your baby is born  Ask your healthcare provider for information if you currently use any of these and need help to quit  Protect your  during delivery and while you are in the hospital:  It is not known for sure if an unborn baby can be infected with the virus that causes COVID-19  Some newborns have tested positive for the virus  The newborns may have been infected before, during, or after birth  The greatest risk is for a  to be in close contact with an infected person  Your baby may be tested for the virus soon after being born if you have COVID-23  He or she may be tested again before you leave the hospital  This depends on whether your baby has any signs or symptoms of COVID-19  You will be able to make choices for you and your baby during your hospital stay  Talk to healthcare providers about the following:  · Ask about temporary separation if you have COVID-19  Temporary separation means your  is moved to a different room from you  You will be able to make the decision if you want to do this  Separation will help lower your 's risk for being infected  You will still be able to give your  breast milk  You may need to pump the milk from your breasts  Someone who does not have COVID-19 will then feed the pumped milk to your    You may instead choose to have your baby brought to you when you want to breastfeed  Take precautions to keep your baby safe  Wash your hands and the skin around your nipples before you hold your baby  Wear a face covering while you breastfeed  · Be careful if you have COVID-19 and do not choose temporary separation  Healthcare providers will keep your  at least 6 feet (2 meters) away from you as much as possible  Your  may be placed in an incubator  The incubator will help protect your  from infection  Always wash your hands and put on a face covering when you hold, touch, or have close contact with your   · Ask about visitors  The facility may not be allowing any visitors to newborns during this time  If you are allowed visitors, you may need to limit how many you can have at a time  Do not allow anyone who has known or suspected COVID-19 to visit  Even without signs or symptoms, the person can infect your  or others in the room  All visitors need to wash their hands and put on clean face coverings before entering your room  The face covering needs to stay on during the whole visit  Do not let anyone take the face covering down to make faces at your baby, talk, sneeze, or cough  Do not let anyone kiss you or your baby  Protect your  at home:   · You can choose to continue temporary separation if you have COVID-19  You can do this if an adult who does not have COVID-19 can care for your   Your healthcare provider can give you instructions on how to do this safely at home  Only have close contact with your  when needed  Remember to wash your hands and put on a clean face covering first  You may need to continue pumping your breast milk  A healthy adult can feed the pumped breast milk to your   You may instead choose to have your baby brought to you when you want to breastfeed  Take precautions to keep your baby safe  Wash your hands and the skin around your nipples before you hold your baby   You will also need to wear a face covering while you breastfeed  · Use face coverings safely  Everyone who has COVID-19 needs to wear a clean face covering while being within 6 feet (2 meters) of your   This includes other children in your home who are 2 years or older  Do not put a face covering or plastic face shield on your   Any covering increases your 's risk for sudden infant death syndrome (SIDS)  Do not use coverings on children younger than 2 years or on anyone who has breathing problems or cannot remove it  · Be careful about visitors  Continue precautions you used in the hospital  Do not allow anyone who has known or suspected COVID-19 to come over to see your   Have visitors put on clean face coverings before they enter your home  Have them wash their hands as soon as they come in  The face covering needs to stay on during the whole visit  · Keep all checkup appointments  You may be able to have some appointments by phone or video meeting  Other appointments will need to be in person, such as for vaccines  Vaccines are normally given to babies at certain ages  Until COVID-19 is under control, your 's provider will give you a vaccine schedule  It is important for your  to get all recommended vaccines  What you need to know about breastfeeding:  Breastfeeding for the first 6 months decreases your baby's risk for respiratory (lung) infections, allergies, asthma, and stomach problems  Breast milk also helps your baby develop a strong immune system  Breast milk is considered safe, even if you have COVID-19  Experts currently believe the virus that causes COVID-19 does not spread in breast milk  Do the following to help protect your baby:  · Wash your hands before every breastfeeding or pumping session  Even if you do not have COVID-19, you can transfer the virus from your hands to your baby or the pump   Use soap and water to wash your hands whenever possible  Use hand  that contains alcohol if soap and water are not available  · Clean and sanitize your breast pump after each use  Follow the 's directions for cleaning and sanitizing the pump  It is important not to use it until it is clean and sanitized  · If you have COVID-19:      ? Wear a face covering while you breastfeed or pump  This will help prevent you from passing the virus through droplets when you talk, cough, sneeze, or laugh  The virus can stay on surfaces such as a breast pump for hours to days  ? Have someone who is not infected bottle feed your baby, if possible  Have the person wash his or her hands with soap and water before each feeding  The person can feed your  pumped breast milk or formula  Follow up with your doctor or obstetrician as directed:  Write down your questions so you remember to ask them during your visits  For more information:   · Centers for Disease Control and Prevention  [address] [place] , [address]  Phone: [phone]  Web Address: [employer]    © 88 [place] [address]  Information is for End User's use only and may not be sold, redistributed or otherwise used for commercial purposes  All illustrations and images included in CareNotes® are the copyrighted property of A D A M , Inc  or [facility] [place][note 2] José Miguel Corbett   The above information is an  only  It is not intended as medical advice for individual conditions or treatments  Talk to your doctor, nurse or pharmacist before following any medical regimen to see if it is safe and effective for you  Pregnancy at [address] to [address]:   What changes are happening in my body? You are now at the end of your first trimester and entering your second trimester  Morning sickness usually goes away by this time  You may have other symptoms such as fatigue, frequent urination, and headaches  You may have gained 2 to 4 pounds by now    How do I care for myself at this stage of my pregnancy? · Get plenty of rest   You may feel more tired than normal  You may need to take naps or go to bed earlier  · Manage nausea and vomiting  Avoid fatty and spicy foods  Eat small meals throughout the day instead of large meals  Sherrill may help to decrease nausea  Ask your healthcare provider about other ways of decreasing nausea and vomiting  · Eat a variety of healthy foods  Healthy foods include fruits, vegetables, whole-grain breads, low-fat dairy foods, beans, lean meats, and fish  Drink liquids as directed  Ask how much liquid to drink each day and which liquids are best for you  Limit caffeine to less than 200 milligrams each day  Limit your intake of fish to 2 servings each week  Choose fish low in mercury such as canned light tuna, shrimp, salmon, cod, or tilapia  Do not  eat fish high in mercury such as swordfish, tilefish, naty mackerel, and shark  · Take prenatal vitamins as directed  Your need for certain vitamins and minerals, such as folic acid, increases during pregnancy  Prenatal vitamins provide some of the extra vitamins and minerals you need  Prenatal vitamins may also help to decrease the risk of certain birth defects  · Do not smoke  Smoking increases your risk of a miscarriage and other health problems during your pregnancy  Smoking can cause your baby to be born too early or weigh less at birth  Ask your healthcare provider for information if you need help quitting  · Do not drink alcohol  Alcohol passes from your body to your baby through the placenta  It can affect your baby's brain development and cause fetal alcohol syndrome (FAS)  FAS is a group of conditions that causes mental, behavior, and growth problems  · Talk to your healthcare provider before you take any medicines  Many medicines may harm your baby if you take them when you are pregnant   Do not take any medicines, vitamins, herbs, or supplements without first talking to your healthcare provider  Never use illegal or street drugs (such as marijuana or cocaine) while you are pregnant  What are some safety tips during pregnancy? · Avoid hot tubs and saunas  Do not use a hot tub or sauna while you are pregnant, especially during your first trimester  Hot tubs and saunas may raise your baby's temperature and increase the risk of birth defects  · Avoid toxoplasmosis  This is an infection caused by eating raw meat or being around infected cat feces  It can cause birth defects, miscarriages, and other problems  Wash your hands after you touch raw meat  Make sure any meat is well-cooked before you eat it  Avoid raw eggs and unpasteurized milk  Use gloves or ask someone else to clean your cat's litter box while you are pregnant  What changes are happening with my baby? Your baby has fully formed fingernails and toenails  Your baby's heartbeat can now be heard  Ask your healthcare provider if you can listen to your baby's heartbeat  By week 14, your baby is over 4 inches long from the top of the head to the rump (baby's bottom)  Your baby weighs over 3 ounces  What do I need to know about prenatal care? Prenatal care is a series of visits with your healthcare provider throughout your pregnancy  During the first 28 weeks of your pregnancy, you will see your healthcare provider 1 time each month  Prenatal care can help prevent problems during pregnancy and childbirth  Your healthcare provider will check your blood pressure and weight  Your baby's heart rate will also be checked  You may also need the following at some visits:  · A pelvic exam  allows your healthcare provider to see your cervix (the bottom part of your uterus)  Your healthcare provider will use a speculum to open your vagina  He or she will check the size and shape of your uterus      · Blood tests  may be done to check for any of the following:    ? Gestational diabetes or anemia (low iron level)    ? Blood type or Rh factor, or certain birth defects    ? Immunity to certain diseases, such as chickenpox or rubella    ? An infection, such as a sexually transmitted infection, HIV, or hepatitis B    · Hepatitis B  may need to be prevented or treated  Hepatitis B is inflammation of the liver caused by the hepatitis B virus (HBV)  HBV can spread from a mother to her baby during delivery  You will be checked for HBV as early as possible in the first trimester of each pregnancy  You need the test even if you received the hepatitis B vaccine or were tested before  You may need to have an HBV infection treated before you give birth  · Urine tests  may also be done to check for sugar and protein  These can be signs of gestational diabetes or preeclampsia  Urine tests may also be done to check for signs of infection  · A fetal ultrasound  shows pictures of your baby inside your uterus  The pictures are used to check your baby's development, movement, and position  · Genetic disorder screening tests  may be offered to you  These tests check your baby's risk for genetic disorders such as Down syndrome  A screening test includes a blood test and ultrasound  When should I seek immediate care? · You have pain or cramping in your abdomen or low back  · You have heavy vaginal bleeding or clotting  · You pass material that looks like tissue or large clots  Collect the material and bring it with you  When should I call my doctor or obstetrician? · You cannot keep food or drinks down, and you are losing weight  · You have light bleeding  · You have chills or a fever  · You have vaginal itching, burning, or pain  · You have yellow, green, white, or foul-smelling vaginal discharge  · You have pain or burning when you urinate, less urine than usual, or pink or bloody urine  · You have questions or concerns about your condition or care      CARE AGREEMENT:   You have the right to help plan your care  Learn about your health condition and how it may be treated  Discuss treatment options with your healthcare providers to decide what care you want to receive  You always have the right to refuse treatment  The above information is an  only  It is not intended as medical advice for individual conditions or treatments  Talk to your doctor, nurse or pharmacist before following any medical regimen to see if it is safe and effective for you  © Copyright [facility] 2021 Information is for End User's use only and may not be sold, redistributed or otherwise used for commercial purposes   All illustrations and images included in CareNotes® are the copyrighted property of [employer]  or [address] [place] [address]

## 2022-01-03 ENCOUNTER — ROUTINE PRENATAL (OUTPATIENT)
Dept: PERINATAL CARE | Facility: CLINIC | Age: 32
End: 2022-01-03
Payer: COMMERCIAL

## 2022-01-03 ENCOUNTER — TELEPHONE (OUTPATIENT)
Dept: HEMATOLOGY ONCOLOGY | Facility: CLINIC | Age: 32
End: 2022-01-03

## 2022-01-03 ENCOUNTER — INITIAL PRENATAL (OUTPATIENT)
Dept: OBGYN CLINIC | Facility: MEDICAL CENTER | Age: 32
End: 2022-01-03

## 2022-01-03 VITALS — BODY MASS INDEX: 25.42 KG/M2 | SYSTOLIC BLOOD PRESSURE: 118 MMHG | WEIGHT: 139 LBS | DIASTOLIC BLOOD PRESSURE: 68 MMHG

## 2022-01-03 VITALS
BODY MASS INDEX: 25.83 KG/M2 | SYSTOLIC BLOOD PRESSURE: 121 MMHG | HEIGHT: 62 IN | DIASTOLIC BLOOD PRESSURE: 75 MMHG | WEIGHT: 140.4 LBS | HEART RATE: 84 BPM

## 2022-01-03 DIAGNOSIS — Z34.01 ENCOUNTER FOR SUPERVISION OF NORMAL FIRST PREGNANCY IN FIRST TRIMESTER: ICD-10-CM

## 2022-01-03 DIAGNOSIS — Z3A.13 13 WEEKS GESTATION OF PREGNANCY: ICD-10-CM

## 2022-01-03 DIAGNOSIS — D69.6 THROMBOCYTOPENIA COMPLICATING PREGNANCY (HCC): Primary | ICD-10-CM

## 2022-01-03 DIAGNOSIS — Z36.82 ENCOUNTER FOR NUCHAL TRANSLUCENCY TESTING: Primary | ICD-10-CM

## 2022-01-03 DIAGNOSIS — O99.119 THROMBOCYTOPENIA COMPLICATING PREGNANCY (HCC): Primary | ICD-10-CM

## 2022-01-03 DIAGNOSIS — Z11.3 ROUTINE SCREENING FOR STI (SEXUALLY TRANSMITTED INFECTION): ICD-10-CM

## 2022-01-03 PROCEDURE — PNV: Performed by: NURSE PRACTITIONER

## 2022-01-03 PROCEDURE — 87491 CHLMYD TRACH DNA AMP PROBE: CPT | Performed by: NURSE PRACTITIONER

## 2022-01-03 PROCEDURE — 76813 OB US NUCHAL MEAS 1 GEST: CPT | Performed by: OBSTETRICS & GYNECOLOGY

## 2022-01-03 PROCEDURE — 87591 N.GONORRHOEAE DNA AMP PROB: CPT | Performed by: NURSE PRACTITIONER

## 2022-01-03 PROCEDURE — 99203 OFFICE O/P NEW LOW 30 MIN: CPT | Performed by: OBSTETRICS & GYNECOLOGY

## 2022-01-03 NOTE — PROGRESS NOTES
Patient chose to have [address] screen  Instructed patient on process for checking her OOP cost via [employer] [facility]  Provided [id number] instruction card toll free # [phone]  Patient made aware if [id number]  unable to give an estimate she will need to contact M office prior to blood draw  Patient aware that  is provided by third party and is only an estimate of cost not a guarantee  Insurance may require prior authorization, if test drawn without prior authorization she will be responsible for full cost of test   For definitive OOP cost, lab deductible or if lab authorization is required patient encouraged to call her insurance provider  Explained customer service insurance phone # located on the back of her ID card  Maternal Fetal Medicine will have results in approximately 7-10 business days and will call patient or notify via [address]  Patient aware viewing lab result online will reveal gender  [id number] lab kit with prepaid [employer]  given to patient  Patient verbalized understanding of all instructions and no questions at this time  Request for prior authorization sent to DALTON Aviles

## 2022-01-03 NOTE — LETTER
January 3, 2022     Dee Welsh MD  [address] [facility]  [address] [facility]    Patient: eCleste Sandhu   YOB: 1990   Date of Visit: 1/3/2022       Dear Dr Angelita Smith:    Thank you for referring Marta Mckay to me for evaluation  Below are my notes for this consultation  If you have questions, please do not hesitate to call me  I look forward to following your patient along with you  Sincerely,        Marlene Garcia MD        CC: No Recipients  Marlene Garcia MD  1/3/2022 12:17 PM  Sign when Signing Visit  CONSULT NOTE    Dee Welsh MD  [address] [facility]  [address] [facility]  [place],  [address]      Thank you for referring your Celeste Sandhu for a Maternal-Fetal Medicine Consultation:  Below is my consultation  Thank you very much for requesting a consultation on this very nice patient for the indication of genetic screening  This is the patient's 2nd pregnancy  First pregnancy resulted in a chemical pregnancy  She has no other significant medical or surgical history  She currently takes prenatal vitamins, Zyrtec, and Flonase  She has no known drug allergies  Substance use history and family medical history are unremarkable  A review of systems is otherwise negative  We discussed the options for genetic screening, including but not limited to first trimester screening, second trimester screening, combined first and second trimester screening, noninvasive prenatal screening (NIPS) for patients at high risk and diagnostic screening through the use of CVS and amniocentesis    We discussed the risks and benefits of each approach including the sensitivities and false positive rates as well as the difference between a screening test and a diagnostic test   At the conclusion of our discussion the patient elected noninvasive prenatal screening utilizing the MaterniT 21 plus test   The patient was given a requisition and collection kit to have this performed at the lab  The results should be available in approximately 7-10 days  We discussed follow-up in detail and I recommend an anatomy ultrasound be scheduled for 20 weeks gestation  Thank you very much for allowing us to participate in the care of this very nice patient  Should you have any questions, please do not hesitate to contact our office  Please note, in addition to the time spent discussing the results of the ultrasound, I spent approximately 15 minutes of face-to-face time with the patient, greater than 50% of which was spent in counseling and the coordination of care for this patient  Portions of the record may have been created with voice recognition software  Occasional wrong word or "sound a like" substitutions may have occurred due to the inherent limitations of voice recognition software  Read the chart carefully and recognize, using context, where substitutions have occurred  Leon Fernando MD  Attending Physician, [place]

## 2022-01-03 NOTE — PROGRESS NOTES
CONSULT NOTE    Drea Foster MD  [address]  [place],  [address]      Thank you for referring your Melissa Engle for a Maternal-Fetal Medicine Consultation:  Below is my consultation  Thank you very much for requesting a consultation on this very nice patient for the indication of genetic screening  This is the patient's 2nd pregnancy  First pregnancy resulted in a chemical pregnancy  She has no other significant medical or surgical history  She currently takes prenatal vitamins, Zyrtec, and Flonase  She has no known drug allergies  Substance use history and family medical history are unremarkable  A review of systems is otherwise negative  We discussed the options for genetic screening, including but not limited to first trimester screening, second trimester screening, combined first and second trimester screening, noninvasive prenatal screening (NIPS) for patients at high risk and diagnostic screening through the use of CVS and amniocentesis  We discussed the risks and benefits of each approach including the sensitivities and false positive rates as well as the difference between a screening test and a diagnostic test   At the conclusion of our discussion the patient elected noninvasive prenatal screening utilizing the MaterniT 21 plus test   The patient was given a requisition and collection kit to have this performed at the lab  The results should be available in approximately 7-10 days  We discussed follow-up in detail and I recommend an anatomy ultrasound be scheduled for 20 weeks gestation  Thank you very much for allowing us to participate in the care of this very nice patient  Should you have any questions, please do not hesitate to contact our office      Please note, in addition to the time spent discussing the results of the ultrasound, I spent approximately 15 minutes of face-to-face time with the patient, greater than 50% of which was spent in counseling and the coordination of care for this patient  Portions of the record may have been created with voice recognition software  Occasional wrong word or "sound a like" substitutions may have occurred due to the inherent limitations of voice recognition software  Read the chart carefully and recognize, using context, where substitutions have occurred  Leon Bradford MD  Attending Physician, [place]

## 2022-01-04 LAB
C TRACH DNA SPEC QL NAA+PROBE: NEGATIVE
N GONORRHOEA DNA SPEC QL NAA+PROBE: NEGATIVE

## 2022-01-05 ENCOUNTER — TELEPHONE (OUTPATIENT)
Dept: PERINATAL CARE | Facility: CLINIC | Age: 32
End: 2022-01-05

## 2022-01-05 ENCOUNTER — DOCUMENTATION (OUTPATIENT)
Dept: PERINATAL CARE | Facility: CLINIC | Age: 32
End: 2022-01-05

## 2022-01-05 NOTE — PROGRESS NOTES
Requested auth for [id number] through [facility] per insurance response form This group does not require authorization for this service  Please verify eligibility and benefits

## 2022-01-05 NOTE — TELEPHONE ENCOUNTER
Left [facility] for pt to inform her that per Sherl Senate,  prior authorization is not required for NIPT  PT instructed to contact for OOP cost prior to obtaining blood work and then she can take the test kit to [facility] or [facility], which ever lab is in network for her  PT instructed to contact office with any questions

## 2022-01-06 ENCOUNTER — TELEPHONE (OUTPATIENT)
Dept: HEMATOLOGY ONCOLOGY | Facility: CLINIC | Age: 32
End: 2022-01-06

## 2022-01-06 NOTE — TELEPHONE ENCOUNTER
New Patient Intake Form   Patient Details:    Trae Wong  1990  [id number]    Appointment Information   Who is calling to schedule? Patient   If not self, what is the caller's name? Please put name of RBC nurse as well  Referring provider Kira Franco MD   What is the diagnosis? Thrombocytopenia affecting pregnancy ([facility] [address] )   Is there a confirmed tissue diagnosis? No   Is patient aware of diagnosis? Yes   Have you had any imaging or labs done? If yes, where? (If imaging done outside of [facility], please remind patient to bring a disk ) No   Have you been seen by another Oncologist/Hematologist?  If so, who and where? No    Are the records in [facility] or Care Everywhere? yes   Are records needed from an outside facility? no   If yes, Name of facility, city and state where facility is located  n/a   Preferred [facility]   Is the patient willing to be seen by another provider?   (This is for breast patients only) n/a   Miscellaneous Information: appt made for 01/12

## 2022-01-10 ENCOUNTER — APPOINTMENT (OUTPATIENT)
Dept: LAB | Facility: CLINIC | Age: 32
End: 2022-01-10
Payer: COMMERCIAL

## 2022-01-10 NOTE — PROGRESS NOTES
Hematology Outpatient Office Note    Date of Service: 1/12/2022    [facility] HEMATOLOGY SPECIALISTS [facility]  [phone]    Reason for Consultation:   Chief Complaint   Patient presents with   [address] Patient Visit       Referral Physician: Reid Og MD    Primary Care Physician:  Roopa Sue DO       ASSESSMENT & PLAN      Diagnosis ICD-10-CM Associated Orders   1  Thrombocytopenia complicating pregnancy ([facility] [address] )  O99 119     D69 6    2  Thrombocytopenia affecting pregnancy ([facility] [address] )  O99 119 Ambulatory referral to Hematology / Oncology    D69 6          This is a 36 y o  c PMHx notable for a spontaneous miscarriage, being seen in consultation for mild thrombocytopenia        Most likely etiology for very mild platelet drop in pregnancy is dilutional from plasma blood expansion  No history of autoimmune disease, liver disease, recent infection/immunization, malignancy  Otherwise, the differential would include consumption from DIC,  infection (HIV, HCV, other viruses), medications, alcohol effect, isolated vitamin deficiency, autoimmune destruction (ITP), other thrombotic microangiopathy (TTP, HUS), HIT, platelet sequestration and artefact/pseudothrombocytopenia (EDTA-induced or other clumping)  HIV and Hep C testing negative 12/2021  PLASMIC score can't be calculated as data is incomplete (bilirubin and hemolysis evaluation) and will await these studies before assessing for Thrombotic microangiopathic processes, however, less likely with absence of anemia  -peripheral blood smear to assess for hemolysis  -INR, PTT, fibrinogen, LDH  -LDH   -CMP to assess for bilirubin  -MMA, fibrinogen, Vit B12 for nutritional profile    Low platelet count patient counseling:    Blood is made up of many different types of cells floating in liquid  Most of these blood cells are red blood cells, which contribute to the red color of blood   Red blood cells carry oxygen to the rest of the body  The other blood cells are neutrophils (which help fight off bacterial infections), lymphocytes (which help coordinate the immune system and fight viruses), and platelets (which are important in preventing bleeding, repairing blood vessel damage when injury happens, among other jobs)  Due to there being so many blood cells, and so many different types of blood cells, these are often the first signs of trouble on routine testing, or when people have testing done for symptoms that are not related to a blood condition  We get concerned when platelets are below [phone] because bleeding can happen if the platelets get much lower than this  It is at this point that we start treatments to address low platelet levels  We like to look for possible reasons for low platelets when they get below 100,000 or so  Your platelets are not in a range that is very concerning right now since there is no known increased risk of bleeding based on your blood count levels  Many forms of liver disease can be associated with low blood counts  Cirrhosis, where the liver is damaged and not working properly, can lead to red blood cells being broken down too quickly and being trapped in the spleen  Liver disease can be worsened or caused, in part, by obesity (which is currently the most common risk factor in the [place]), virus infections in the past, autoimmune diseases, medications, and alcohol  Up to 46% of [facility] in the [facility] will have fatty liver, this condition occurs when fat gets stored in the liver as a result of obesity, diabetes, etc  This can progress into DURHAM (non-alcoholic steatohepatitis),  cirrhosis, and potentially even liver cancer if left untreated  Common reasons for low platelets include clumping of the cells so that they are not counted correctly in the laboratory  Platelets are supposed to clump when they sense injury to the blood vessel, and this can increase over time  Also pseudothrombocytopenia 'fake low platelet count' can occur when the platelets get activated by the chemical used in blood tubes  Low platelets are also commonly found as people age  Low platelets can also commonly occur in Immune Thrombocytopenia (ITP)  This can be found by itself or in association with other autoimmune/rheumatology diseases such as rheumatoid arthritis/lupus/mixed connective tissue disorder  By the books, ITP requires a platelet count less than 100,000 on a consistent basis  · Discussion of decision making    I personally reviewed the following lab results, the image studies, pathology, other specialty/physicians consult notes and recommendations, and outside medical records from [facility]  I had a lengthy discussion with the patient and shared the work-up findings  We discussed the diagnosis and management plan as below  I spent 48 minutes reviewing the records (labs, clinician notes, outside records, medical history, ordering medicine/tests/procedures, interpreting the imaging/labs previously done) and coordination of care as well as direct time with the patient today, of which greater than 50% of the time was spent in counseling and coordination of care with the patient/family  Follow Up: 1-2 weeks    All questions were answered to the patient's satisfaction during this encounter  The patient knows the contact information for our office and knows to reach out for any relevant concerns related to this encounter  They are to call for any temperature 100 4 or higher, new symptoms including but not restricted to shaking chills, decreased appetite, nausea, vomiting, diarrhea, increased fatigue, shortness of breath or chest pain, confusion, and not feeling the strength to come to the clinic  For all other listed problems and medical diagnosis in their chart - they are managed by PCP and/or other specialists, which the patient acknowledges   Thank you very much for your consultation and making us a part of this patient's care  We are continuing to follow closely with you  Please do not hesitate to reach out to me with any additional questions or concerns  Emily Gutierrez MD  Hematology & Medical Oncology Staff Physician             Disclaimer: This document was prepared using [employer] Direct technology  If a word or phrase is confusing, or does not make sense, this is likely due to recognition error which was not discovered during this clinician's review  If you believe an error has occurred, please contact me through [address] [place] line for jesscia? cation  HEMATOLOGICAL HISTORY OF PRESENT ILLNESS      Clotting History None   Bleeding History None   Cancer History None   Family Cancer History mGrandfather (colon), pGrandfather (lymphoma?)   H/O Blood/Plt Transfusion None   Tobacco/etoh/drug abuse No tobacco use or etoh abuse    Hx COVID19 Infection and Vaccine Status Moderna x2   Cancer Screening history    Occupation Veterinary nurse   Pain: SI  Joint pain      SUBJECTIVE  (INTERVAL HISTORY)        I have reviewed the relevant past medical, surgical, social and family history  I have also reviewed allergies and medications for this patient  Review of Systems  Intermittent HA from hx migraine  Mild nausea from pregnancy  She denies night sweats, F/C, V, SOB, CP, LH, HA, hematuria, melena, hematochezia, falls, unilateral weakness  She is otherwise doing well and not having acute issues  She continues to work  A 10-point review of system was performed, pertinent positive and negative were detailed as above  Otherwise, the 10-point review of system was negative        Past Medical History:   Diagnosis Date    Known health problems: none     Varicella     chicken pox       Past Surgical History:   Procedure Laterality Date    WISDOM TOOTH EXTRACTION         Family History   Problem Relation Age of Onset    Depression Mother     Bipolar disorder Mother  Seizures Mother     Hypertension Father     Heart disease Father     Heart attack Father     Colon cancer Maternal Grandfather     Cancer Paternal Grandfather     Kidney cancer Paternal Grandfather     Stroke Paternal Grandfather     Heart attack Paternal Grandfather     No Known Problems Brother     Diabetes Maternal Grandmother     Hypertension Maternal Grandmother     Anxiety disorder Maternal Grandmother     Depression Maternal Grandmother     Heart attack Paternal Grandmother     No Known Problems Half-Brother     Anxiety disorder Half-Brother     Depression Half-Brother     Breast cancer Neg Hx     Ovarian cancer Neg Hx        Social History     Socioeconomic History    Marital status: /Civil Union     Spouse name: Not on file    Number of children: Not on file    Years of education: Not on file    Highest education level: Not on file   Occupational History    Not on file   Tobacco Use    Smoking status: Never Smoker    Smokeless tobacco: Never Used   Vaping Use    Vaping Use: Never used   Substance and Sexual Activity    Alcohol use: Not Currently    Drug use: Never    Sexual activity: Yes     Partners: Male     Birth control/protection: None   Other Topics Concern    Not on file   Social History Narrative    Not on file     Social Determinants of Health     Financial Resource Strain: Not on file   Food Insecurity: Not on file   Transportation Needs: Not on file   Physical Activity: Not on file   Stress: Not on file   Social Connections: Not on file   Intimate Partner Violence: Not on file   Housing Stability: Not on file       Allergies   Allergen Reactions    Latex Hives       Current Outpatient Medications   Medication Sig Dispense Refill    cetirizine (ZyrTEC) 10 mg tablet Take 10 mg by mouth daily      fluticasone (FLONASE) 50 mcg/act nasal spray 2 sprays into each nostril daily      Prenatal Vit-Fe Fumarate-FA (PRENATAL VITAMINS PO) Take by mouth       No current facility-administered medications for this visit  (Not in a hospital admission)        Objective:     24 Hour Vitals Assessment:     Vitals:    01/12/22 0933   BP: 112/68   Pulse: 94   Resp: 17   Temp: 98 3 °F (36 8 °C)   SpO2: 99%       PHYSICIAN EXAM:    General: Appearance: alert, cooperative, no distress  HEENT: Normocephalic, atraumatic  No scleral icterus  conjunctivae clear  EOMI  Chest: No tenderness to palpation  No open wound noted  Lungs: Clear to auscultation bilaterally, Respirations unlabored  Cardiac: Regular rate and rhythm, +S1and S2, -r/m/g  Abdomen: Soft, non-tender, non-distended  Bowel sounds are normal   Extremities:  No edema, cyanosis, clubbing  Skin: Skin color, turgor are normal  No rashes  Lymphatics: no palpable supra-cervical, axillary, or inguinal adenopathy  Neurologic: Awake, Alert, and oriented, no gross focal deficits noted b/l  DATA REVIEW:    Pathology Result:    No results found for: FINALDX       Image Results:   Image result are reviewed and documented in Hematology/Oncology history  I personally reviewed these images  AMB US OB < 14 weeks single or first gestation level 1  Transvaginal Pelvic Ultrasound  Elliott IUP  Yolk sac: Present  Fetal Pole: Present  CRL consistent with EGA 9w0d  Cardiac activity: Present   bpm  Right adnexal corpus luteum      LABS:  Lab data are reviewed and documented in HemOnc history  Lab Results   Component Value Date    HGB 12 7 12/21/2021    HCT 38 1 12/21/2021    MCV 92 12/21/2021     (L) 12/21/2021    WBC 5 99 12/21/2021    NRBC 0 12/21/2021     No results found for: NA, K, CL, CO2, ANIONGAP, BUN, CREATININE, GLUCOSE, GLUF, CALCIUM, CORRECTEDCA, AST, ALT, ALKPHOS, PROT, BILITOT, EGFR    No results found for: IRON, TIBC, FERRITIN    No results found for: [id number]    No results for input(s): WBC, CREAT in the last 72 hours      Invalid input(s):  PLT     By:  Thalia Canas MD, 1/12/2022, 9:46 AM

## 2022-01-12 ENCOUNTER — CONSULT (OUTPATIENT)
Dept: HEMATOLOGY ONCOLOGY | Facility: CLINIC | Age: 32
End: 2022-01-12
Payer: COMMERCIAL

## 2022-01-12 VITALS
TEMPERATURE: 98.3 F | SYSTOLIC BLOOD PRESSURE: 112 MMHG | OXYGEN SATURATION: 99 % | DIASTOLIC BLOOD PRESSURE: 68 MMHG | RESPIRATION RATE: 17 BRPM | WEIGHT: 141.6 LBS | HEIGHT: 62 IN | BODY MASS INDEX: 26.06 KG/M2 | HEART RATE: 94 BPM

## 2022-01-12 DIAGNOSIS — O99.119 THROMBOCYTOPENIA AFFECTING PREGNANCY (HCC): ICD-10-CM

## 2022-01-12 DIAGNOSIS — D69.6 THROMBOCYTOPENIA AFFECTING PREGNANCY (HCC): ICD-10-CM

## 2022-01-12 DIAGNOSIS — D69.6 THROMBOCYTOPENIA COMPLICATING PREGNANCY (HCC): Primary | ICD-10-CM

## 2022-01-12 DIAGNOSIS — O99.119 THROMBOCYTOPENIA COMPLICATING PREGNANCY (HCC): Primary | ICD-10-CM

## 2022-01-12 PROCEDURE — 99204 OFFICE O/P NEW MOD 45 MIN: CPT | Performed by: INTERNAL MEDICINE

## 2022-01-13 ENCOUNTER — APPOINTMENT (OUTPATIENT)
Dept: LAB | Facility: CLINIC | Age: 32
End: 2022-01-13
Payer: COMMERCIAL

## 2022-01-13 DIAGNOSIS — O99.119 THROMBOCYTOPENIA COMPLICATING PREGNANCY (HCC): ICD-10-CM

## 2022-01-13 DIAGNOSIS — D69.6 THROMBOCYTOPENIA COMPLICATING PREGNANCY (HCC): ICD-10-CM

## 2022-01-13 LAB
ALBUMIN SERPL BCP-MCNC: 3.3 G/DL (ref 3.5–5)
ALP SERPL-CCNC: 45 U/L (ref 46–116)
ALT SERPL W P-5'-P-CCNC: 18 U/L (ref 12–78)
ANION GAP SERPL CALCULATED.3IONS-SCNC: 7 MMOL/L (ref 4–13)
APTT PPP: 27 SECONDS (ref 23–37)
AST SERPL W P-5'-P-CCNC: 13 U/L (ref 5–45)
BILIRUB SERPL-MCNC: 0.5 MG/DL (ref 0.2–1)
BUN SERPL-MCNC: 6 MG/DL (ref 5–25)
CALCIUM ALBUM COR SERPL-MCNC: 9.9 MG/DL (ref 8.3–10.1)
CALCIUM SERPL-MCNC: 9.3 MG/DL (ref 8.3–10.1)
CHLORIDE SERPL-SCNC: 107 MMOL/L (ref 100–108)
CO2 SERPL-SCNC: 23 MMOL/L (ref 21–32)
CREAT SERPL-MCNC: 0.52 MG/DL (ref 0.6–1.3)
ERYTHROCYTE [DISTWIDTH] IN BLOOD BY AUTOMATED COUNT: 12.9 % (ref 11.6–15.1)
FIBRINOGEN PPP-MCNC: 336 MG/DL (ref 227–495)
FOLATE SERPL-MCNC: >20 NG/ML (ref 3.1–17.5)
GFR SERPL CREATININE-BSD FRML MDRD: 129 ML/MIN/1.73SQ M
GLUCOSE P FAST SERPL-MCNC: 84 MG/DL (ref 65–99)
HCT VFR BLD AUTO: 36.6 % (ref 34.8–46.1)
HGB BLD-MCNC: 12.1 G/DL (ref 11.5–15.4)
INR PPP: 0.97 (ref 0.84–1.19)
LDH SERPL-CCNC: 165 U/L (ref 81–234)
MCH RBC QN AUTO: 30.6 PG (ref 26.8–34.3)
MCHC RBC AUTO-ENTMCNC: 33.1 G/DL (ref 31.4–37.4)
MCV RBC AUTO: 93 FL (ref 82–98)
NRBC BLD AUTO-RTO: 0 /100 WBCS
PLATELET # BLD AUTO: 136 THOUSANDS/UL (ref 149–390)
PMV BLD AUTO: 12.4 FL (ref 8.9–12.7)
POTASSIUM SERPL-SCNC: 3.9 MMOL/L (ref 3.5–5.3)
PROT SERPL-MCNC: 7.1 G/DL (ref 6.4–8.2)
PROTHROMBIN TIME: 12.5 SECONDS (ref 11.6–14.5)
RBC # BLD AUTO: 3.95 MILLION/UL (ref 3.81–5.12)
SODIUM SERPL-SCNC: 137 MMOL/L (ref 136–145)
VIT B12 SERPL-MCNC: 398 PG/ML (ref 100–900)
WBC # BLD AUTO: 8.54 THOUSAND/UL (ref 4.31–10.16)

## 2022-01-13 PROCEDURE — 83918 ORGANIC ACIDS TOTAL QUANT: CPT

## 2022-01-13 PROCEDURE — 85384 FIBRINOGEN ACTIVITY: CPT

## 2022-01-13 PROCEDURE — 82746 ASSAY OF FOLIC ACID SERUM: CPT

## 2022-01-13 PROCEDURE — 84630 ASSAY OF ZINC: CPT

## 2022-01-13 PROCEDURE — 82525 ASSAY OF COPPER: CPT

## 2022-01-13 PROCEDURE — 36415 COLL VENOUS BLD VENIPUNCTURE: CPT

## 2022-01-13 PROCEDURE — 85027 COMPLETE CBC AUTOMATED: CPT

## 2022-01-13 PROCEDURE — 85007 BL SMEAR W/DIFF WBC COUNT: CPT

## 2022-01-13 PROCEDURE — 85730 THROMBOPLASTIN TIME PARTIAL: CPT

## 2022-01-13 PROCEDURE — 82607 VITAMIN B-12: CPT

## 2022-01-13 PROCEDURE — 80053 COMPREHEN METABOLIC PANEL: CPT

## 2022-01-13 PROCEDURE — 83615 LACTATE (LD) (LDH) ENZYME: CPT

## 2022-01-13 PROCEDURE — 85610 PROTHROMBIN TIME: CPT

## 2022-01-17 LAB
BURR CELLS BLD QL SMEAR: PRESENT
IMM EOSINOPHIL NFR BLD MANUAL: 1 % (ref 0–6)
LG PLATELETS BLD QL SMEAR: PRESENT
LYMPHOCYTES NFR BLD: 31 % (ref 14–44)
METAMYELOCYTES NFR BLD MANUAL: 1 % (ref 0–1)
MONOCYTES NFR BLD AUTO: 1 % (ref 4–12)
NEUTS BAND NFR BLD MANUAL: 13 THOUSAND/UL
NEUTS SEG NFR BLD AUTO: 52 % (ref 45–77)
PATHOLOGIST INTERPRETATION: NORMAL
PATHOLOGY REVIEW: YES
PLATELET BLD QL SMEAR: ABNORMAL
POIKILOCYTOSIS BLD QL SMEAR: PRESENT
TOTAL CELLS COUNTED SPEC: 100
VARIANT LYMPHS BLD QL SMEAR: 1 % (ref 0–0)
WBC TOXIC VACUOLES BLD QL SMEAR: PRESENT

## 2022-01-18 LAB
COPPER SERPL-MCNC: 158 UG/DL (ref 80–158)
ZINC SERPL-MCNC: 73 UG/DL (ref 44–115)

## 2022-01-19 LAB — METHYLMALONATE SERPL-SCNC: 156 NMOL/L (ref 0–378)

## 2022-01-19 NOTE — PROGRESS NOTES
Hematology Outpatient Office Note    Date of Service: 1/20/2022    [facility] HEMATOLOGY SPECIALISTS [facility]  [phone]    Reason for Consultation:   Chief Complaint   Patient presents with    Follow-up       Referral Physician: No ref  provider found    Primary Care Physician:  Heidi Cedeño DO     : Sharita Bodily      ASSESSMENT & PLAN      Diagnosis ICD-10-CM Associated Orders   1  Thrombocytopenia complicating pregnancy [facility])  O99 119     D69 6          This is a 36 y o  c PMHx notable for a spontaneous miscarriage, being seen in consultation for mild thrombocytopenia        Most likely etiology for very mild platelet drop in pregnancy is dilutional from plasma blood expansion  No history of autoimmune disease, liver disease, recent infection/immunization, malignancy  Other considerations on the differential have been ruled out including DIC (fibrinogen WNL), medications, alcohol effect, isolated vitamin deficiency, other thrombotic microangiopathy (no schistocytes on smear)  HIV and Hep C testing negative 12/2021  Total bilirubin within normal limits making hemolytic process unlikely  Repeat plt 1/13/22 136k, down from 143k  Folate, Vit B12 not deficient  Fibrinogen, PTT, copper, zinc, INR WNL  Peripheral smear: many echinocytes (Liver U/S)  MMA in process    Hyperlipidemia and liver disease are more common reasons for the presence of echinocytes in the peripheral blood  There are several other less common etiologies that don't have evidence of being present in this patient include pyruvate kinase deficiency, proliferative malignancy, myeloproliferative disorders, chronic kidney disease and uremia, and a few others  Low platelet count patient counseling:    Blood is made up of many different types of cells floating in liquid  Most of these blood cells are red blood cells, which contribute to the red color of blood   Red blood cells carry oxygen to the rest of the body  The other blood cells are neutrophils (which help fight off bacterial infections), lymphocytes (which help coordinate the immune system and fight viruses), and platelets (which are important in preventing bleeding, repairing blood vessel damage when injury happens, among other jobs)  Due to there being so many blood cells, and so many different types of blood cells, these are often the first signs of trouble on routine testing, or when people have testing done for symptoms that are not related to a blood condition  We get concerned when platelets are below [phone] because bleeding can happen if the platelets get much lower than this  It is at this point that we start treatments to address low platelet levels  We like to look for possible reasons for low platelets when they get below 100,000 or so  Your platelets are not in a range that is very concerning right now since there is no known increased risk of bleeding based on your blood count levels  Many forms of liver disease can be associated with low blood counts  Cirrhosis, where the liver is damaged and not working properly, can lead to red blood cells being broken down too quickly and being trapped in the spleen  Liver disease can be worsened or caused, in part, by obesity (which is currently the most common risk factor in the [place]), virus infections in the past, autoimmune diseases, medications, and alcohol  Up to 46% of adults in the [facility] will have fatty liver, this condition occurs when fat gets stored in the liver as a result of obesity, diabetes, etc  This can progress into DURHAM (non-alcoholic steatohepatitis),  cirrhosis, and potentially even liver cancer if left untreated  Common reasons for low platelets include clumping of the cells so that they are not counted correctly in the laboratory   Platelets are supposed to clump when they sense injury to the blood vessel, and this can increase over time  Also pseudothrombocytopenia 'fake low platelet count' can occur when the platelets get activated by the chemical used in blood tubes  Low platelets are also commonly found as people age  Low platelets can also commonly occur in Immune Thrombocytopenia (ITP)  This can be found by itself or in association with other autoimmune/rheumatology diseases such as rheumatoid arthritis/lupus/mixed connective tissue disorder  By the books, ITP requires a platelet count less than 100,000 on a consistent basis  Plan:  -Lipid panel, U/S to assess for liver disease and hyperlipidemia  -repeat CBC in 3 months with follow-up visit at that time      · Discussion of decision making    I personally reviewed the following lab results, the image studies, pathology, other specialty/physicians consult notes and recommendations, and outside medical records from [facility]  I had a lengthy discussion with the patient and shared the work-up findings  We discussed the diagnosis and management plan as below  I spent 32 minutes reviewing the records (labs, clinician notes, outside records, medical history, ordering medicine/tests/procedures, interpreting the imaging/labs previously done) and coordination of care as well as direct time with the patient today, of which greater than 50% of the time was spent in counseling and coordination of care with the patient/family  Follow Up: 3 months with CBC    All questions were answered to the patient's satisfaction during this encounter  The patient knows the contact information for our office and knows to reach out for any relevant concerns related to this encounter  They are to call for any temperature 100 4 or higher, new symptoms including but not restricted to shaking chills, decreased appetite, nausea, vomiting, diarrhea, increased fatigue, shortness of breath or chest pain, confusion, and not feeling the strength to come to the clinic   For all other listed problems and medical diagnosis in their chart - they are managed by PCP and/or other specialists, which the patient acknowledges  Thank you very much for your consultation and making us a part of this patient's care  We are continuing to follow closely with you  Please do not hesitate to reach out to me with any additional questions or concerns  Emily Rowland MD  Hematology & Medical Oncology Staff Physician             Disclaimer: This document was prepared using [employer] technology  If a word or phrase is confusing, or does not make sense, this is likely due to recognition error which was not discovered during this clinician's review  If you believe an error has occurred, please contact me through [address] [place] line for jessica? cation  HEMATOLOGICAL HISTORY OF PRESENT ILLNESS      Clotting History None   Bleeding History None   Cancer History None   Family Cancer History mGrandfather (colon), pGrandfather (lymphoma?)   H/O Blood/Plt Transfusion None   Tobacco/etoh/drug abuse No tobacco use or etoh abuse    Hx COVID19 Infection and Vaccine Status Moderna x2   Cancer Screening history    Occupation Veterinary nurse   Pain: SI  Joint pain      SUBJECTIVE  (INTERVAL HISTORY)        I have reviewed the relevant past medical, surgical, social and family history  I have also reviewed allergies and medications for this patient  Review of Systems  Intermittent HA from hx migraine  Mild nausea from pregnancy  She denies night sweats, F/C, V, SOB, CP, LH, HA, hematuria, melena, hematochezia, falls, unilateral weakness  She is otherwise doing well and not having acute issues  She continues to work  No new issues since the last visit  A 10-point review of system was performed, pertinent positive and negative were detailed as above  Otherwise, the 10-point review of system was negative        Past Medical History:   Diagnosis Date    Known health problems: none     Varicella     chicken pox Past Surgical History:   Procedure Laterality Date    WISDOM TOOTH EXTRACTION         Family History   Problem Relation Age of Onset    Depression Mother     Bipolar disorder Mother     Seizures Mother     Hypertension Father     Heart disease Father     Heart attack Father     Colon cancer Maternal Grandfather     Cancer Paternal Grandfather     Kidney cancer Paternal Grandfather     Stroke Paternal Grandfather     Heart attack Paternal Grandfather     No Known Problems Brother     Diabetes Maternal Grandmother     Hypertension Maternal Grandmother     Anxiety disorder Maternal Grandmother     Depression Maternal Grandmother     Heart attack Paternal Grandmother     No Known Problems Half-Brother     Anxiety disorder Half-Brother     Depression Half-Brother     Breast cancer Neg Hx     Ovarian cancer Neg Hx        Social History     Socioeconomic History    Marital status: /Civil Union     Spouse name: Not on file    Number of children: Not on file    Years of education: Not on file    Highest education level: Not on file   Occupational History    Not on file   Tobacco Use    Smoking status: Never Smoker    Smokeless tobacco: Never Used   Vaping Use    Vaping Use: Never used   Substance and Sexual Activity    Alcohol use: Not Currently    Drug use: Never    Sexual activity: Yes     Partners: Male     Birth control/protection: None   Other Topics Concern    Not on file   Social History Narrative    Not on file     Social Determinants of Health     Financial Resource Strain: Not on file   Food Insecurity: Not on file   Transportation Needs: Not on file   Physical Activity: Not on file   Stress: Not on file   Social Connections: Not on file   Intimate Partner Violence: Not on file   Housing Stability: Not on file       Allergies   Allergen Reactions    Latex Hives       Current Outpatient Medications   Medication Sig Dispense Refill    cetirizine (ZyrTEC) 10 mg tablet Take 10 mg by mouth daily      fluticasone (FLONASE) 50 mcg/act nasal spray 2 sprays into each nostril daily      Prenatal Vit-Fe Fumarate-FA (PRENATAL VITAMINS PO) Take by mouth       No current facility-administered medications for this visit  (Not in a hospital admission)        Objective:     24 Hour Vitals Assessment:     Vitals:    01/20/22 0843   BP: 126/76   Pulse: 79   Resp: 16   Temp: (!) 96 8 °F (36 °C)   SpO2: 100%       PHYSICIAN EXAM:    General: Appearance: alert, cooperative, no distress  HEENT: Normocephalic, atraumatic  No scleral icterus  conjunctivae clear  EOMI  Chest: No tenderness to palpation  No open wound noted  Lungs: Clear to auscultation bilaterally, Respirations unlabored  Cardiac: Regular rate and rhythm, +S1and S2, -r/m/g  Abdomen: Soft, non-tender, non-distended  Bowel sounds are normal   Extremities:  No edema, cyanosis, clubbing  Skin: Skin color, turgor are normal  No rashes  Neurologic: Awake, Alert, and oriented, no gross focal deficits noted b/l  DATA REVIEW:    Pathology Result:    No results found for: FINALDX       Image Results:   Image result are reviewed and documented in Hematology/Oncology history  I personally reviewed these images  AMB US OB < 14 weeks single or first gestation level 1  Transvaginal Pelvic Ultrasound  Elliott IUP  Yolk sac: Present  Fetal Pole: Present  CRL consistent with EGA 9w0d  Cardiac activity: Present   bpm  Right adnexal corpus luteum      LABS:  Lab data are reviewed and documented in HemOnc history         Lab Results   Component Value Date    HGB 12 1 01/13/2022    HCT 36 6 01/13/2022    MCV 93 01/13/2022     (L) 01/13/2022    WBC 8 54 01/13/2022    NRBC 0 01/13/2022     Lab Results   Component Value Date    K 3 9 01/13/2022     01/13/2022    CO2 23 01/13/2022    BUN 6 01/13/2022    CREATININE 0 52 (L) 01/13/2022    GLUF 84 01/13/2022    CALCIUM 9 3 01/13/2022    CORRECTEDCA 9 9 01/13/2022    AST 13 01/13/2022    ALT 18 01/13/2022    ALKPHOS 45 (L) 01/13/2022    EGFR 129 01/13/2022       No results found for: IRON, TIBC, FERRITIN    Lab Results   Component Value Date    [id number] 398 01/13/2022       No results for input(s): WBC, CREAT in the last 72 hours      Invalid input(s):  PLT     By:  Pushpa Abel MD, 1/20/2022, 8:58 AM

## 2022-01-20 ENCOUNTER — OFFICE VISIT (OUTPATIENT)
Dept: HEMATOLOGY ONCOLOGY | Facility: CLINIC | Age: 32
End: 2022-01-20
Payer: COMMERCIAL

## 2022-01-20 VITALS
RESPIRATION RATE: 16 BRPM | DIASTOLIC BLOOD PRESSURE: 76 MMHG | HEART RATE: 79 BPM | TEMPERATURE: 96.8 F | BODY MASS INDEX: 26.57 KG/M2 | SYSTOLIC BLOOD PRESSURE: 126 MMHG | WEIGHT: 144.4 LBS | HEIGHT: 62 IN | OXYGEN SATURATION: 100 %

## 2022-01-20 DIAGNOSIS — D69.6 THROMBOCYTOPENIA COMPLICATING PREGNANCY (HCC): Primary | ICD-10-CM

## 2022-01-20 DIAGNOSIS — O99.119 THROMBOCYTOPENIA COMPLICATING PREGNANCY (HCC): Primary | ICD-10-CM

## 2022-01-20 PROCEDURE — 3008F BODY MASS INDEX DOCD: CPT | Performed by: INTERNAL MEDICINE

## 2022-01-20 PROCEDURE — 1036F TOBACCO NON-USER: CPT | Performed by: INTERNAL MEDICINE

## 2022-01-20 PROCEDURE — 99214 OFFICE O/P EST MOD 30 MIN: CPT | Performed by: INTERNAL MEDICINE

## 2022-01-27 ENCOUNTER — HOSPITAL ENCOUNTER (OUTPATIENT)
Dept: ULTRASOUND IMAGING | Facility: HOSPITAL | Age: 32
Discharge: HOME | End: 2022-01-27
Attending: INTERNAL MEDICINE
Payer: COMMERCIAL

## 2022-01-27 DIAGNOSIS — O99.119 THROMBOCYTOPENIA COMPLICATING PREGNANCY (HCC): ICD-10-CM

## 2022-01-27 DIAGNOSIS — D69.6 THROMBOCYTOPENIA COMPLICATING PREGNANCY (HCC): ICD-10-CM

## 2022-01-27 PROCEDURE — 76700 US EXAM ABDOM COMPLETE: CPT

## 2022-01-31 ENCOUNTER — ROUTINE PRENATAL (OUTPATIENT)
Dept: OBGYN CLINIC | Facility: MEDICAL CENTER | Age: 32
End: 2022-01-31

## 2022-01-31 VITALS — WEIGHT: 142 LBS | SYSTOLIC BLOOD PRESSURE: 110 MMHG | DIASTOLIC BLOOD PRESSURE: 70 MMHG | BODY MASS INDEX: 25.97 KG/M2

## 2022-01-31 DIAGNOSIS — D69.6 THROMBOCYTOPENIA COMPLICATING PREGNANCY (HCC): ICD-10-CM

## 2022-01-31 DIAGNOSIS — O99.119 THROMBOCYTOPENIA COMPLICATING PREGNANCY (HCC): ICD-10-CM

## 2022-01-31 DIAGNOSIS — Z3A.17 17 WEEKS GESTATION OF PREGNANCY: Primary | ICD-10-CM

## 2022-01-31 PROCEDURE — PNV: Performed by: STUDENT IN AN ORGANIZED HEALTH CARE EDUCATION/TRAINING PROGRAM

## 2022-02-16 NOTE — PROGRESS NOTES
Please refer to the [facility] ultrasound report in Ob Procedures for additional information regarding today's visit

## 2022-02-17 ENCOUNTER — ROUTINE PRENATAL (OUTPATIENT)
Dept: PERINATAL CARE | Facility: OTHER | Age: 32
End: 2022-02-17
Payer: COMMERCIAL

## 2022-02-17 VITALS
BODY MASS INDEX: 26.24 KG/M2 | WEIGHT: 142.6 LBS | HEIGHT: 62 IN | HEART RATE: 81 BPM | DIASTOLIC BLOOD PRESSURE: 70 MMHG | SYSTOLIC BLOOD PRESSURE: 109 MMHG

## 2022-02-17 DIAGNOSIS — Z3A.20 20 WEEKS GESTATION OF PREGNANCY: Primary | ICD-10-CM

## 2022-02-17 DIAGNOSIS — Z36.86 ENCOUNTER FOR ANTENATAL SCREENING FOR CERVICAL LENGTH: ICD-10-CM

## 2022-02-17 DIAGNOSIS — Z36.3 ENCOUNTER FOR ANTENATAL SCREENING FOR MALFORMATIONS: ICD-10-CM

## 2022-02-17 PROCEDURE — 76805 OB US >/= 14 WKS SNGL FETUS: CPT | Performed by: OBSTETRICS & GYNECOLOGY

## 2022-02-17 PROCEDURE — 76817 TRANSVAGINAL US OBSTETRIC: CPT | Performed by: OBSTETRICS & GYNECOLOGY

## 2022-02-17 PROCEDURE — 99213 OFFICE O/P EST LOW 20 MIN: CPT | Performed by: OBSTETRICS & GYNECOLOGY

## 2022-02-17 PROCEDURE — 3008F BODY MASS INDEX DOCD: CPT | Performed by: OBSTETRICS & GYNECOLOGY

## 2022-02-17 NOTE — PROGRESS NOTES
Ultrasound Probe Disinfection    A transvaginal ultrasound was performed  Prior to use, disinfection was performed with High Level Disinfection Process (Trophon)  Probe serial number [id number]: [id number] was used        ITALIA Foote , ALFREDITO  02/17/22  9:00 AM

## 2022-02-17 NOTE — LETTER
February 17, 2022     [address] Nancy Garrett MD  [address]    Patient: Jennyfer Jacobsen   YOB: 1990   Date of Visit: 2/17/2022       Dear Dr Nancy Garrett: Thank you for referring Haider Phillips to me for evaluation  Below are my notes for this consultation  If you have questions, please do not hesitate to call me  I look forward to following your patient along with you  Sincerely,        Mickey Parker MD        CC: No Recipients  Mickey Parker MD  2/16/2022  4:23 PM  Sign when Signing Visit  Please refer to the [facility] ultrasound report in Ob Procedures for additional information regarding today's visit

## 2022-03-01 PROBLEM — Z3A.22 22 WEEKS GESTATION OF PREGNANCY: Status: ACTIVE | Noted: 2022-01-03

## 2022-03-02 ENCOUNTER — ROUTINE PRENATAL (OUTPATIENT)
Dept: OBGYN CLINIC | Facility: MEDICAL CENTER | Age: 32
End: 2022-03-02
Payer: COMMERCIAL

## 2022-03-02 VITALS — DIASTOLIC BLOOD PRESSURE: 70 MMHG | BODY MASS INDEX: 27.29 KG/M2 | SYSTOLIC BLOOD PRESSURE: 110 MMHG | WEIGHT: 149.2 LBS

## 2022-03-02 DIAGNOSIS — Z34.92 SECOND TRIMESTER PREGNANCY: ICD-10-CM

## 2022-03-02 DIAGNOSIS — O99.119 THROMBOCYTOPENIA COMPLICATING PREGNANCY (HCC): ICD-10-CM

## 2022-03-02 DIAGNOSIS — Z23 NEED FOR INFLUENZA VACCINATION: ICD-10-CM

## 2022-03-02 DIAGNOSIS — D69.6 THROMBOCYTOPENIA COMPLICATING PREGNANCY (HCC): ICD-10-CM

## 2022-03-02 DIAGNOSIS — Z3A.22 22 WEEKS GESTATION OF PREGNANCY: Primary | ICD-10-CM

## 2022-03-02 PROCEDURE — PNV: Performed by: ADVANCED PRACTICE MIDWIFE

## 2022-03-02 PROCEDURE — 90471 IMMUNIZATION ADMIN: CPT | Performed by: ADVANCED PRACTICE MIDWIFE

## 2022-03-02 PROCEDURE — 90686 IIV4 VACC NO PRSV 0.5 ML IM: CPT | Performed by: ADVANCED PRACTICE MIDWIFE

## 2022-03-02 NOTE — PATIENT INSTRUCTIONS
Pregnancy at 23 to 26 [address]:   You are now close to or at the beginning of the third trimester  The third trimester starts at 24 weeks and ends with delivery  As your baby gets larger, you may develop certain symptoms  These may include pain in your back or down the sides of your abdomen  You may also have stretch marks on your abdomen, breasts, thighs, or buttocks  You may also have constipation  DISCHARGE INSTRUCTIONS:   Return to the emergency department if:   · You develop a severe headache that does not go away  · You have new or increased vision changes, such as blurred or spotted vision  · You have new or increased swelling in your face or hands  · You have vaginal spotting or bleeding  · Your water broke or you feel warm water gushing or trickling from your vagina  Call your doctor or obstetrician if:   · You have abdominal cramps, pressure, or tightening  · You have a change in vaginal discharge  · You have light bleeding  · You have chills or a fever  · You have vaginal itching, burning, or pain  · You have yellow, green, white, or foul-smelling vaginal discharge  · You have pain or burning when you urinate, less urine than usual, or pink or bloody urine  · You have questions or concerns about your condition or care  How to care for yourself at this stage of your pregnancy:       · Eat a variety of healthy foods  Healthy foods include fruits, vegetables, whole-grain breads, low-fat dairy foods, beans, lean meats, and fish  Drink liquids as directed  Ask how much liquid to drink each day and which liquids are best for you  Limit caffeine to less than 200 milligrams each day  Limit your intake of fish to 2 servings each week  Choose fish low in mercury such as canned light tuna, shrimp, salmon, cod, or tilapia  Do not  eat fish high in mercury such as swordfish, tilefish, naty mackerel, and shark  · Manage back pain    Do not stand for long periods of time or lift heavy items  Use good posture while you stand, squat, or bend  Wear low-heeled shoes with good support  Rest may also help to relieve back pain  Ask your healthcare provider about exercises you can do to strengthen your back muscles  · Take prenatal vitamins as directed  Your need for certain vitamins and minerals, such as folic acid, increases during pregnancy  Prenatal vitamins provide some of the extra vitamins and minerals you need  Prenatal vitamins may also help to decrease the risk of certain birth defects  · Talk to your healthcare provider about exercise  Moderate exercise can help you stay fit  Your healthcare provider will help you plan an exercise program that is safe for you during pregnancy  · Do not smoke  Smoking increases your risk of a miscarriage and other health problems during your pregnancy  Smoking can cause your baby to be born too early or weigh less at birth  Ask your healthcare provider for information if you need help quitting  · Do not drink alcohol  Alcohol passes from your body to your baby through the placenta  It can affect your baby's brain development and cause fetal alcohol syndrome (FAS)  FAS is a group of conditions that causes mental, behavior, and growth problems  · Talk to your healthcare provider before you take any medicines  Many medicines may harm your baby if you take them when you are pregnant  Do not take any medicines, vitamins, herbs, or supplements without first talking to your healthcare provider  Never use illegal or street drugs (such as marijuana or cocaine) while you are pregnant  Safety tips:   · Avoid hot tubs and saunas  Do not use a hot tub or sauna while you are pregnant, especially during your first trimester  Hot tubs and saunas may raise your baby's temperature and increase the risk of birth defects  · Avoid toxoplasmosis    This is an infection caused by eating raw meat or being around infected cat feces  It can cause birth defects, miscarriages, and other problems  Wash your hands after you touch raw meat  Make sure any meat is well-cooked before you eat it  Avoid raw eggs and unpasteurized milk  Use gloves or ask someone else to clean your cat's litter box while you are pregnant  Changes that are happening with your baby:  By 26 weeks, your baby will weigh about 2 pounds  Your baby will be about 10 inches long from the top of the head to the rump (baby's bottom)  Your baby's movements are much stronger now  Your baby's eyes are almost completely formed and can partially open  Your baby also sleeps and wakes up  What you need to know about prenatal care: Your healthcare provider will check your blood pressure and weight  You may also need the following:  · A urine test  may also be done to check for sugar and protein  These can be signs of gestational diabetes or infection  Protein in your urine may also be a sign of preeclampsia  Preeclampsia is a condition that can develop during week 20 or later of your pregnancy  It causes high blood pressure, and it can cause problems with your kidneys and other organs  · A gestational diabetes screen  may be done  Your healthcare provider may order either a 1-step or 2-step oral glucose tolerance test (OGTT)  ? 1-step OGTT:  Your blood sugar level will be tested after you have not eaten for 8 hours (fasting)  You will then be given a glucose drink  Your level will be tested again 1 hour and 2 hours after you finish the drink  ? 2-step OGTT:  You do not have to fast for the first part of the test  You will have the glucose drink at any time of day  Your blood sugar level will be checked 1 hour later  If your blood sugar is higher than a certain level, another test will be ordered  You will fast and your blood sugar level will be tested  You will have the glucose drink   Your blood will be tested again 1 hour, 2 hours, and 3 hours after you finish the glucose drink  · Fundal height  is a measurement of your uterus to check your baby's growth  This number is usually the same as the number of weeks that you have been pregnant  · Your baby's heart rate  will be checked  Follow up with your doctor or obstetrician as directed:  Write down your questions so you remember to ask them during your visits  © Copyright [employer] 2022 Information is for End User's use only and may not be sold, redistributed or otherwise used for commercial purposes  All illustrations and images included in CareNotes® are the copyrighted property of [employer]  or [facility] [employer] Corbett   The above information is an  only  It is not intended as medical advice for individual conditions or treatments  Talk to your doctor, nurse or pharmacist before following any medical regimen to see if it is safe and effective for you

## 2022-03-02 NOTE — PROGRESS NOTES
Routine Prenatal Visit  [facility] [address] [place] [address]    Assessment/Plan:  Kenny Nuno is a 36y o  year old  at 22w0d who presents for routine prenatal visit  1  22 weeks gestation of pregnancy  Assessment & Plan:  Reviewed 2nd trimester precautions  Anatomy scan complete  Taking PNV daily  Next visit 4 weeks    Orders:  -     influenza vaccine, quadrivalent, 0 5 mL, preservative-free, for adult and pediatric patients 6 mos+ (AFLURIA, FLUARIX, FLULAVAL, FLUZONE)    2  Thrombocytopenia complicating pregnancy ([address] )  -     influenza vaccine, quadrivalent, 0 5 mL, preservative-free, for adult and pediatric patients 6 mos+ (AFLURIA, FLUARIX, FLULAVAL, FLUZONE)    3  Second trimester pregnancy  -     influenza vaccine, quadrivalent, 0 5 mL, preservative-free, for adult and pediatric patients 6 mos+ (AFLURIA, FLUARIX, FLULAVAL, FLUZONE)    4  Need for influenza vaccination  -     influenza vaccine, quadrivalent, 0 5 mL, preservative-free, for adult and pediatric patients 6 mos+ (AFLURIA, FLUARIX, FLULAVAL, FLUZONE)        Subjective:     CC: Prenatal care    Melissa Engle is a 36 y o   female who presents for routine prenatal care at 22w0d  Pregnancy ROS: No leakage of fluid, pelvic pain, or vaginal bleeding  Good fetal movement  Drinks 1-2 liters per day and works overnight  Good diet with fruits and veggies  Has constipation and started with Colace to 100 mg Monday and 50 mg yesterday, effective  Using Preparation H occasionally  They are going to [place] - flying  Anatomy scan complete        The following portions of the patient's history were reviewed and updated as appropriate: allergies, current medications, past family history, past medical history, obstetric history, gynecologic history, past social history, past surgical history and problem list       Objective:  /70   Wt 67 7 kg (149 lb 3 2 oz)   LMP 2021   BMI 27 29 kg/m² Pregravid Weight/BMI: 59 kg (130 lb) (BMI 23 77)  Current Weight: 67 7 kg (149 lb 3 2 oz)   Total Weight Gain: 8 709 kg (19 lb 3 2 oz)   Pre- Vitals      Most Recent Value   Prenatal Assessment    Fetal Heart Rate 152   Fundal Height (cm) 22 cm   Movement Present   Prenatal Vitals    Blood Pressure 110/70   Weight - Scale 67 7 kg (149 lb 3 2 oz)   Urine Albumin/Glucose    Dilation/Effacement/Station    Vaginal Drainage    Edema    LLE Edema None   RLE Edema None           General: Well appearing, no distress  Respiratory: Unlabored breathing  Cardiovascular: Regular rate  Abdomen: Soft, gravid, nontender  Fundal Height: Appropriate for gestational age  Extremities: Warm and well perfused  Non tender

## 2022-03-21 ENCOUNTER — TELEPHONE (OUTPATIENT)
Dept: HEMATOLOGY ONCOLOGY | Facility: CLINIC | Age: 32
End: 2022-03-21

## 2022-03-21 NOTE — TELEPHONE ENCOUNTER
I called the patient & informed her that Dr Naima Jordan schedule changed and that her appointment on 4/21 was moved to 4/22 @ 8:40 am  I then voiced to call the office to reschedule, if the appointment doesn't work  Molly [place]

## 2022-03-23 ENCOUNTER — TELEPHONE (OUTPATIENT)
Dept: HEMATOLOGY ONCOLOGY | Facility: CLINIC | Age: 32
End: 2022-03-23

## 2022-03-23 NOTE — TELEPHONE ENCOUNTER
Appointment Cancellation Or Reschedule     Person calling in Patient    Provider Dr Maurice Harris   Office Visit Date and Time 4/22/22 at 8:30   Office Visit Location [place]   Did patient want to reschedule their office appointment? If so, when was it scheduled to? Yes    4/29/22 at 9:20   Is this patient calling to reschedule an infusion appointment? No   When is their next infusion appointment? N/a   Is this patient a Chemo patient? No   Reason for Cancellation or Reschedule Schedule conflict     If the patient is a treatment patient, please route this to the office nurse  If the patient is not on treatment, please route to the office MA       Patient requesting virtual appointment

## 2022-03-30 ENCOUNTER — ROUTINE PRENATAL (OUTPATIENT)
Dept: OBGYN CLINIC | Facility: MEDICAL CENTER | Age: 32
End: 2022-03-30

## 2022-03-30 VITALS — DIASTOLIC BLOOD PRESSURE: 70 MMHG | WEIGHT: 162 LBS | SYSTOLIC BLOOD PRESSURE: 100 MMHG | BODY MASS INDEX: 29.63 KG/M2

## 2022-03-30 DIAGNOSIS — O99.119 THROMBOCYTOPENIA COMPLICATING PREGNANCY (HCC): ICD-10-CM

## 2022-03-30 DIAGNOSIS — Z3A.26 26 WEEKS GESTATION OF PREGNANCY: ICD-10-CM

## 2022-03-30 DIAGNOSIS — D69.6 THROMBOCYTOPENIA COMPLICATING PREGNANCY (HCC): ICD-10-CM

## 2022-03-30 DIAGNOSIS — Z34.03 ENCOUNTER FOR SUPERVISION OF NORMAL FIRST PREGNANCY IN THIRD TRIMESTER: Primary | ICD-10-CM

## 2022-03-30 PROCEDURE — PNV: Performed by: OBSTETRICS & GYNECOLOGY

## 2022-03-30 RX ORDER — MULTIVITAMIN WITH IRON
TABLET ORAL
COMMUNITY
End: 2022-07-02

## 2022-03-30 NOTE — PROGRESS NOTES
Routine Prenatal Visit  [facility] [address] [place] [address]    Assessment/Plan:  Argentina Desouza is a 29y o  year old  at 26w0d who presents for routine prenatal visit  1  Encounter for supervision of normal first pregnancy in third trimester    2  26 weeks gestation of pregnancy    3  Thrombocytopenia complicating pregnancy (HCC)          Subjective:     CC: Prenatal care    Ginette Saint is a 29 y o   female who presents for routine prenatal care at 26w0d  Pregnancy ROS: no leakage of fluid, pelvic pain, or vaginal bleeding   good fetal movement  28 week labs and lipid panel (ordered by Hem/Onc) at next visit   Discussed likelihood of lipids being abnormal due to pregnancy    The following portions of the patient's history were reviewed and updated as appropriate: allergies, current medications, past family history, past medical history, obstetric history, gynecologic history, past social history, past surgical history and problem list       Objective:  /70   Wt 73 5 kg (162 lb)   LMP 2021   BMI 29 63 kg/m²   Pregravid Weight/BMI: 59 kg (130 lb) (BMI 23 77)  Current Weight: 73 5 kg (162 lb)   Total Weight Gain: 14 5 kg (32 lb)   Pre- Vitals      Most Recent Value   Prenatal Assessment    Fetal Heart Rate 142   Movement Present   Prenatal Vitals    Blood Pressure 100/70   Weight - Scale 73 5 kg (162 lb)   Urine Albumin/Glucose    Dilation/Effacement/Station    Vaginal Drainage    Edema    LLE Edema None   RLE Edema None   Facial Edema None           General: Well appearing, no distress  Respiratory: Unlabored breathing  Cardiovascular: Regular rate  Abdomen: Soft, gravid, nontender  Fundal Height: Appropriate for gestational age  Extremities: Warm and well perfused  Non tender

## 2022-04-19 ENCOUNTER — ROUTINE PRENATAL (OUTPATIENT)
Dept: OBGYN CLINIC | Facility: MEDICAL CENTER | Age: 32
End: 2022-04-19
Payer: COMMERCIAL

## 2022-04-19 VITALS — SYSTOLIC BLOOD PRESSURE: 110 MMHG | WEIGHT: 165 LBS | DIASTOLIC BLOOD PRESSURE: 70 MMHG | BODY MASS INDEX: 30.18 KG/M2

## 2022-04-19 DIAGNOSIS — Z3A.28 28 WEEKS GESTATION OF PREGNANCY: Primary | ICD-10-CM

## 2022-04-19 DIAGNOSIS — Z23 NEED FOR TDAP VACCINATION: ICD-10-CM

## 2022-04-19 DIAGNOSIS — Z3A.22 22 WEEKS GESTATION OF PREGNANCY: ICD-10-CM

## 2022-04-19 LAB
BASOPHILS # BLD AUTO: 0.03 THOUSANDS/ΜL (ref 0–0.1)
BASOPHILS NFR BLD AUTO: 0 % (ref 0–1)
CHOLEST SERPL-MCNC: 233 MG/DL
EOSINOPHIL # BLD AUTO: 0.12 THOUSAND/ΜL (ref 0–0.61)
EOSINOPHIL NFR BLD AUTO: 2 % (ref 0–6)
ERYTHROCYTE [DISTWIDTH] IN BLOOD BY AUTOMATED COUNT: 13.8 % (ref 11.6–15.1)
GLUCOSE 1H P 50 G GLC PO SERPL-MCNC: 164 MG/DL (ref 40–134)
HCT VFR BLD AUTO: 35.9 % (ref 34.8–46.1)
HDLC SERPL-MCNC: 77 MG/DL
HGB BLD-MCNC: 11.5 G/DL (ref 11.5–15.4)
IMM GRANULOCYTES # BLD AUTO: 0.07 THOUSAND/UL (ref 0–0.2)
IMM GRANULOCYTES NFR BLD AUTO: 1 % (ref 0–2)
LDLC SERPL CALC-MCNC: 123 MG/DL (ref 0–100)
LYMPHOCYTES # BLD AUTO: 1.67 THOUSANDS/ΜL (ref 0.6–4.47)
LYMPHOCYTES NFR BLD AUTO: 21 % (ref 14–44)
MCH RBC QN AUTO: 30.1 PG (ref 26.8–34.3)
MCHC RBC AUTO-ENTMCNC: 32 G/DL (ref 31.4–37.4)
MCV RBC AUTO: 94 FL (ref 82–98)
MONOCYTES # BLD AUTO: 0.46 THOUSAND/ΜL (ref 0.17–1.22)
MONOCYTES NFR BLD AUTO: 6 % (ref 4–12)
NEUTROPHILS # BLD AUTO: 5.48 THOUSANDS/ΜL (ref 1.85–7.62)
NEUTS SEG NFR BLD AUTO: 70 % (ref 43–75)
NONHDLC SERPL-MCNC: 156 MG/DL
NRBC BLD AUTO-RTO: 0 /100 WBCS
PLATELET # BLD AUTO: 128 THOUSANDS/UL (ref 149–390)
PMV BLD AUTO: 11.7 FL (ref 8.9–12.7)
RBC # BLD AUTO: 3.82 MILLION/UL (ref 3.81–5.12)
TRIGL SERPL-MCNC: 165 MG/DL
WBC # BLD AUTO: 7.83 THOUSAND/UL (ref 4.31–10.16)

## 2022-04-19 PROCEDURE — PNV: Performed by: OBSTETRICS & GYNECOLOGY

## 2022-04-19 PROCEDURE — 85025 COMPLETE CBC W/AUTO DIFF WBC: CPT | Performed by: OBSTETRICS & GYNECOLOGY

## 2022-04-19 PROCEDURE — 82950 GLUCOSE TEST: CPT | Performed by: OBSTETRICS & GYNECOLOGY

## 2022-04-19 PROCEDURE — 90471 IMMUNIZATION ADMIN: CPT | Performed by: OBSTETRICS & GYNECOLOGY

## 2022-04-19 PROCEDURE — 80061 LIPID PANEL: CPT | Performed by: OBSTETRICS & GYNECOLOGY

## 2022-04-19 PROCEDURE — 36415 COLL VENOUS BLD VENIPUNCTURE: CPT | Performed by: OBSTETRICS & GYNECOLOGY

## 2022-04-19 PROCEDURE — 90715 TDAP VACCINE 7 YRS/> IM: CPT | Performed by: OBSTETRICS & GYNECOLOGY

## 2022-04-19 NOTE — PROGRESS NOTES
It was my intent to perform this visit via video technology but the patient was not able to do a video connection so the visit was completed via audio telephone only  Telemedicine consent    Patient: Nilsa Davy  Provider: Devora Peng MD  Provider located at [address] [place] [address]  [phone]    The patient was identified by name and date of birth  Nilsa Bhatti was informed that this is a telemedicine visit and that the visit is being conducted through Telephone  My office door was closed  No one else was in the room  She acknowledged consent and understanding of privacy and security of the video platform  The patient has agreed to participate and understands they can discontinue the visit at any time  Patient is aware this is a billable service  Hematology Outpatient Office Note    Date of Service: 4/19/2022    [facility] HEMATOLOGY SPECIALISTS [facility]  [phone]    Reason for Consultation:   No chief complaint on file  Referral Physician: No ref  provider found    Primary Care Physician:  Dany Gaytan,      : [place] [facility]      ASSESSMENT & PLAN      Diagnosis ICD-10-CM Associated Orders   1  Thrombocytopenia complicating pregnancy [facility])  O99 119     D69 6          This is a 29 y o  c PMHx notable for a spontaneous miscarriage, being seen in consultation for mild thrombocytopenia while pregnant       Most likely etiology for very mild platelet drop in pregnancy is dilutional from plasma blood expansion  No history of autoimmune disease, liver disease, recent infection/immunization, malignancy  Other considerations on the differential have been ruled out including DIC (fibrinogen WNL), medications, alcohol effect, isolated vitamin deficiency, other thrombotic microangiopathy (no schistocytes on smear)   HIV and Hep C testing negative 12/2021  Total bilirubin within normal limits making hemolytic process unlikely  Repeat plt 1/13/22 136k, down from 143k  Folate, Vit B12 not deficient  Fibrinogen, PTT, copper, zinc, INR WNL  Peripheral smear: many echinocytes   4/19/22: WBC 7 83k, Hgb 11 5, plt 128k    MMA WNL  1/27/22: US Abdomen No evidence of hepatomegaly, well-circumscribed hyperechoic liver lesion  4/19/22 Lipid panel: notable for hyperlipidemia    Hyperlipidemia (seen in this patient) and liver disease (not seen on above testing) are more common reasons for the presence of echinocytes in the peripheral blood  There are several other less common etiologies that don't have evidence of being present in this patient include pyruvate kinase deficiency, proliferative malignancy, myeloproliferative disorders, chronic kidney disease and uremia, and a few others  Low platelet count patient counseling:    Blood is made up of many different types of cells floating in liquid  Most of these blood cells are red blood cells, which contribute to the red color of blood  Red blood cells carry oxygen to the rest of the body  The other blood cells are neutrophils (which help fight off bacterial infections), lymphocytes (which help coordinate the immune system and fight viruses), and platelets (which are important in preventing bleeding, repairing blood vessel damage when injury happens, among other jobs)  Due to there being so many blood cells, and so many different types of blood cells, these are often the first signs of trouble on routine testing, or when people have testing done for symptoms that are not related to a blood condition  We get concerned when platelets are below [phone] because bleeding can happen if the platelets get much lower than this  It is at this point that we start treatments to address low platelet levels  We like to look for possible reasons for low platelets when they get below 100,000 or so   Your platelets are not in a range that is very concerning right now since there is no known increased risk of bleeding based on your blood count levels  Many forms of liver disease can be associated with low blood counts  Cirrhosis, where the liver is damaged and not working properly, can lead to red blood cells being broken down too quickly and being trapped in the spleen  Liver disease can be worsened or caused, in part, by obesity (which is currently the most common risk factor in the [place]), virus infections in the past, autoimmune diseases, medications, and alcohol  Up to 46% of adults in the [facility] will have fatty liver, this condition occurs when fat gets stored in the liver as a result of obesity, diabetes, etc  This can progress into DURHAM (non-alcoholic steatohepatitis),  cirrhosis, and potentially even liver cancer if left untreated  Common reasons for low platelets include clumping of the cells so that they are not counted correctly in the laboratory  Platelets are supposed to clump when they sense injury to the blood vessel, and this can increase over time  Also pseudothrombocytopenia 'fake low platelet count' can occur when the platelets get activated by the chemical used in blood tubes  Low platelets are also commonly found as people age  Low platelets can also commonly occur in Immune Thrombocytopenia (ITP)  This can be found by itself or in association with other autoimmune/rheumatology diseases such as rheumatoid arthritis/lupus/mixed connective tissue disorder  By the books, ITP requires a platelet count less than 100,000 on a consistent basis  ____  She likely has hemodilution from her pregnancy leading to her platelet count being mildly low and will trend it around that time      Plan:  -repeat CBC in 6 months with follow-up visit at that time prn      · Discussion of decision making    I personally reviewed the following lab results, the image studies, pathology, other specialty/physicians consult notes and recommendations, and outside medical records from [facility]  I had a lengthy discussion with the patient and shared the work-up findings  We discussed the diagnosis and management plan as below  I spent 27 minutes reviewing the records (labs, clinician notes, outside records, medical history, ordering medicine/tests/procedures, interpreting the imaging/labs previously done) and coordination of care as well as direct time with the patient today, of which greater than 50% of the time was spent in counseling and coordination of care with the patient/family  Follow Up: prn    All questions were answered to the patient's satisfaction during this encounter  The patient knows the contact information for our office and knows to reach out for any relevant concerns related to this encounter  They are to call for any temperature 100 4 or higher, new symptoms including but not restricted to shaking chills, decreased appetite, nausea, vomiting, diarrhea, increased fatigue, shortness of breath or chest pain, confusion, and not feeling the strength to come to the clinic  For all other listed problems and medical diagnosis in their chart - they are managed by PCP and/or other specialists, which the patient acknowledges  Thank you very much for your consultation and making us a part of this patient's care  We are continuing to follow closely with you  Please do not hesitate to reach out to me with any additional questions or concerns  Emily Kelsey MD  Hematology & Medical Oncology Staff Physician             Disclaimer: This document was prepared using [employer] Flu[employer] Direct technology  If a word or phrase is confusing, or does not make sense, this is likely due to recognition error which was not discovered during this clinician's review  If you believe an error has occurred, please contact me through [address] [place] line for jessica? cation        HEMATOLOGICAL HISTORY OF PRESENT ILLNESS Clotting History None   Bleeding History None   Cancer History None   Family Cancer History mGrandfather (colon), pGrandfather (lymphoma?)   H/O Blood/Plt Transfusion None   Tobacco/etoh/drug abuse No tobacco use or etoh abuse    Hx COVID19 Infection and Vaccine Status Marguerite Kaur x2   Cancer Screening history    Occupation Veterinary nurse   Pain: SI  Joint pain    4/19/22: WBC 7 83k, Hgb 11 5, plt 128k  SUBJECTIVE  (INTERVAL HISTORY)        I have reviewed the relevant past medical, surgical, social and family history  I have also reviewed allergies and medications for this patient  Review of Systems  Intermittent HA from hx migraine  Mild nausea from pregnancy  She denies night sweats, F/C, V, SOB, CP, LH, HA, hematuria, melena, hematochezia, falls, unilateral weakness  She is otherwise doing well and not having acute issues  She continues to work  No new issues since the last visit  A 10-point review of system was performed, pertinent positive and negative were detailed as above  Otherwise, the 10-point review of system was negative        Past Medical History:   Diagnosis Date    Known health problems: none     Varicella     chicken pox       Past Surgical History:   Procedure Laterality Date    WISDOM TOOTH EXTRACTION         Family History   Problem Relation Age of Onset    Depression Mother     Bipolar disorder Mother     Seizures Mother     Hypertension Father     Heart disease Father     Heart attack Father     Colon cancer Maternal Grandfather     Cancer Paternal Grandfather     Kidney cancer Paternal Grandfather     Stroke Paternal Grandfather     Heart attack Paternal Grandfather     No Known Problems Brother     Diabetes Maternal Grandmother     Hypertension Maternal Grandmother     Anxiety disorder Maternal Grandmother     Depression Maternal Grandmother     Heart attack Paternal Grandmother     No Known Problems Half-Brother     Anxiety disorder Half-Brother    Camilla Hernandez Depression Half-Brother     Breast cancer Neg Hx     Ovarian cancer Neg Hx        Social History     Socioeconomic History    Marital status: /Civil Union     Spouse name: Not on file    Number of children: Not on file    Years of education: Not on file    Highest education level: Not on file   Occupational History    Not on file   Tobacco Use    Smoking status: Never Smoker    Smokeless tobacco: Never Used   Vaping Use    Vaping Use: Never used   Substance and Sexual Activity    Alcohol use: Not Currently    Drug use: Never    Sexual activity: Yes     Partners: Male     Birth control/protection: None   Other Topics Concern    Not on file   Social History Narrative    Not on file     Social Determinants of Health     Financial Resource Strain: Not on file   Food Insecurity: Not on file   Transportation Needs: Not on file   Physical Activity: Not on file   Stress: Not on file   Social Connections: Not on file   Intimate Partner Violence: Not on file   Housing Stability: Not on file       Allergies   Allergen Reactions    Latex Hives       Current Outpatient Medications   Medication Sig Dispense Refill    cetirizine (ZyrTEC) 10 mg tablet Take 10 mg by mouth daily      fluticasone (FLONASE) 50 mcg/act nasal spray 2 sprays into each nostril daily      Magnesium 250 MG TABS Take by mouth      Prenatal Vit-Fe Fumarate-FA (PRENATAL VITAMINS PO) Take by mouth       No current facility-administered medications for this visit  (Not in a hospital admission)        Objective:     Not done as this was a virtual visit      DATA REVIEW:    Pathology Result:    No results found for: [facility]       Image Results:   Image result are reviewed and documented in Hematology/Oncology history  I personally reviewed these images  LABS:  Lab data are reviewed and documented in HemOnc history         Lab Results   Component Value Date    HGB 11 5 04/19/2022    HCT 35 9 04/19/2022    MCV 94 04/19/2022    PLT 128 (L) 04/19/2022    WBC 7 83 04/19/2022    NRBC 0 04/19/2022     Lab Results   Component Value Date    K 3 9 01/13/2022     01/13/2022    CO2 23 01/13/2022    BUN 6 01/13/2022    CREATININE 0 52 (L) 01/13/2022    GLUF 84 01/13/2022    CALCIUM 9 3 01/13/2022    CORRECTEDCA 9 9 01/13/2022    AST 13 01/13/2022    ALT 18 01/13/2022    ALKPHOS 45 (L) 01/13/2022    EGFR 129 01/13/2022       No results found for: IRON, TIBC, FERRITIN    Lab Results   Component Value Date    EBFRMLKY91 398 01/13/2022       Recent Labs     04/19/22  0856   WBC 7 83        By:  Trey Huynh MD, 4/19/2022, 1:16 PM

## 2022-04-19 NOTE — PROGRESS NOTES
Amy Davidson is a 29y o  year old  at 30w11d for routine prenatal visit  GTT and CBC done today  RhoGam needed No  Tdap needed Yes Given: Yes  FKC reviewed  28 week booklet and birth plan given today     Consent for delivery discussed and signed   All questions answered  20 week US reviewed , no further US scheduled unless clinically indicated

## 2022-04-19 NOTE — PATIENT INSTRUCTIONS
Fetal Movement   WHAT YOU NEED TO KNOW:   Fetal movements are the kicks, rolls, and hiccups of your unborn baby  You may start to feel these movements when you are 20 weeks pregnant  The movements grow stronger and more frequent as your baby grows  Fetal movements show that your unborn baby is getting the oxygen and nutrients he or she needs before birth  Fewer fetal movements may signal a problem with your baby's health  DISCHARGE INSTRUCTIONS:   Follow up with your doctor or obstetrician as directed:  Write down your questions so you remember to ask them during your visits  Normal fetal movement:  Fetal activity can be described by 4 states, from least to most active  During quiet sleep, your unborn baby may be still for up to 2 hours  During active sleep, he or she kicks, rolls, and moves often  During the quiet awake state, he or she may only move his or her eyes  The active awake state includes strong kicks and rolls  What affects fetal movement:  You may feel your baby move more after you eat, or after you drink caffeine  You may feel your baby move less while you are more active, such as when you exercise  You may also feel fewer movements if you are obese  Certain medicines can change your baby's movements  Tell your healthcare provider about the medicines you are taking  Track fetal movements at home:  Fetal movement is most often felt when you lie quietly on your side  Your healthcare provider may ask you to count movements for 2 hours  He or she may ask you to track how long it takes for your baby to move 10 times  Keep a log of your baby's movements  Contact your doctor or obstetrician if:   · It takes longer than usual to feel 8 of your unborn baby's movements  · You do not feel your unborn baby move at least 10 times in 2 hours  · The skin on your hands, feet, and around your eyes is more swollen than usual     · You have a headache for at least 24 hours      · Tiny red dots appear on your skin     · Your belly is tender when you press on it  · You have questions or concerns about your condition or care  Return to the emergency department if:   · You do not feel your unborn baby move for 12 hours  · You feel cramping or constant pain in your abdomen  · You have heavy bleeding from your vagina  · You have a severe headache and cannot see clearly  · You are having trouble breathing or are vomiting  · You have a seizure  © Copyright [employer] 2022 Information is for End User's use only and may not be sold, redistributed or otherwise used for commercial purposes  All illustrations and images included in CareNotes® are the copyrighted property of A D A M , Inc  or [facility] José Miguel Corbett   The above information is an  only  It is not intended as medical advice for individual conditions or treatments  Talk to your doctor, nurse or pharmacist before following any medical regimen to see if it is safe and effective for you

## 2022-04-21 ENCOUNTER — TELEPHONE (OUTPATIENT)
Dept: FAMILY MEDICINE CLINIC | Facility: CLINIC | Age: 32
End: 2022-04-21

## 2022-04-21 NOTE — RESULT ENCOUNTER NOTE
Please inform patient 1 hr gtt elevated , needs 3 hour gtt  Her lipid panel  was also abnormal however this panel should be repeated after delivery as pregnancy affects lipid panel as a normal response

## 2022-04-22 NOTE — TELEPHONE ENCOUNTER
Usually we have the mother call after the baby is born  If it is a vaginal delivery we usually see the baby a day or so after discharge from the hospital   Obviously a  baby is seen after mom is discharged from the hospital which is a few days longer  So we make the time for the baby    I fit the baby into my schedule as priority

## 2022-04-28 ENCOUNTER — TELEPHONE (OUTPATIENT)
Dept: HEMATOLOGY ONCOLOGY | Facility: CLINIC | Age: 32
End: 2022-04-28

## 2022-04-28 NOTE — TELEPHONE ENCOUNTER
CALL RETURN FORM   Reason for patient call? Patient calling to request a virtual visit for her appt 4/29/22 @ 9:20am     Patient's primary oncologist? Dr Ketan Marcano    Name of person the patient was calling for? Dr Isaias Chavez   Any additional information to add, if applicable? She states that someone was going to call her back a few days ago  Informed patient that the message will be forwarded to the team and someone will get back to them as soon as possible    Did you relay this information to the patient?   [phone]

## 2022-04-29 ENCOUNTER — TELEMEDICINE (OUTPATIENT)
Dept: HEMATOLOGY ONCOLOGY | Facility: CLINIC | Age: 32
End: 2022-04-29
Payer: COMMERCIAL

## 2022-04-29 DIAGNOSIS — O99.119 THROMBOCYTOPENIA COMPLICATING PREGNANCY (HCC): Primary | ICD-10-CM

## 2022-04-29 DIAGNOSIS — D69.6 THROMBOCYTOPENIA COMPLICATING PREGNANCY (HCC): Primary | ICD-10-CM

## 2022-04-29 PROCEDURE — 99213 OFFICE O/P EST LOW 20 MIN: CPT | Performed by: INTERNAL MEDICINE

## 2022-05-04 ENCOUNTER — TELEPHONE (OUTPATIENT)
Dept: OBGYN CLINIC | Facility: MEDICAL CENTER | Age: 32
End: 2022-05-04

## 2022-05-04 PROBLEM — Z3A.31 31 WEEKS GESTATION OF PREGNANCY: Status: ACTIVE | Noted: 2022-01-03

## 2022-05-04 PROBLEM — O99.810 ABNORMAL GLUCOSE TOLERANCE TEST (GTT) DURING PREGNANCY, ANTEPARTUM: Status: ACTIVE | Noted: 2022-05-04

## 2022-05-04 NOTE — PATIENT INSTRUCTIONS
Thank you for visiting [facility]  You may be invited to complete a survey about you visit  Your responses will help us improve care we provide  A 10 means the care you received at your visit met your expectations  If you are unable to give a 10 please list reasons so we can work on improving your patient experience  COVID-19 and Pregnancy   AMBULATORY CARE:   What you need to know about coronavirus disease 2019 (COVID-19) and pregnancy:  Pregnancy increases your risk for severe COVID-19 illness  COVID-19 can also lead to  delivery of your baby  Most babies who become infected with the new virus do not develop serious effects, but some do  It is important for you and your baby to stay safe during pregnancy and delivery  Signs and symptoms of COVID-19 in newborns: The following signs and symptoms may be from COVID-19, but they are also common in newborns  Your 's healthcare provider may recommend testing to confirm or rule out COVID-19  Your  may need a second test if the first is negative  · Fever    · Not moving arms or legs much, or being too sleepy to feed    · A runny nose or cough    · Fast breathing, or trouble breathing    · Vomiting, diarrhea, or not feeding well    Call your local emergency number (911 in the [address]) if:   · You have trouble breathing or shortness of breath at rest     · You have chest pain or pressure that lasts longer than 5 minutes  · You become confused or hard to wake  · Your lips or face are blue  Seek care immediately if:   · You have a fever of 104°F (40°C) or higher  Call your doctor if:   · You have symptoms of COVID-19  · You have questions or concerns about your condition or care  How the 2019 coronavirus spreads: The virus spreads quickly and easily  The virus can be passed starting 2 to 3 days before symptoms begin or before a positive test if symptoms never begin    · Droplets are the main way all coronaviruses spread  The virus travels in droplets that form when a person talks, sings, coughs, or sneezes  The droplets can also float in the air for minutes or hours  Infection happens when you breathe in the droplets or get them in your eyes or nose  Close personal contact with an infected person increases your risk for infection  This means being within 6 feet (2 meters) of the person for at least 15 minutes over 24 hours  · Person-to-person contact can spread the virus  For example, a person with the virus on his or her hands can spread it by shaking hands with someone  · The virus can stay on objects and surfaces for up to 3 days  You may become infected by touching the object or surface and then touching your eyes or mouth  Protect yourself and your baby while you are pregnant: If you have COVID-19 during your pregnancy, healthcare providers will monitor you and your baby closely  Work with your healthcare provider or obstetrician  If you do not have either, experts recommend you contact a local [facility] health center or health department  The following measures can help keep you and your baby safe  Continue even after you are vaccinated against COVID-19  These are still the best ways to prevent infection:  · Wash your hands throughout the day  Use soap and water  Rub your soapy hands together, lacing your fingers  Wash the front and back of each hand, and in between your fingers  Use the fingers of one hand to scrub under the fingernails of the other hand  Wash for at least 20 seconds  Rinse with warm, running water for several seconds  Dry your hands with a clean towel or paper towel  Use hand  that contains alcohol if soap and water are not available  · Protect yourself from sneezes and coughs  Turn your face away and cover your mouth and nose if you are around someone who is sneezing or coughing  This helps protect you from the person's droplets   Cover your mouth and nose with a tissue when you need to sneeze or cough  Use the bend of your arm if you do not have a tissue  Throw the tissue away  Then wash your hands or use hand   · Try not to touch your face  If you get the virus on your hands, you can transfer it to your eyes, nose, or mouth and become infected  You can also transfer it to objects, surfaces, or people  · Follow worldwide, national, and local social distancing guidelines  Keep at least 6 feet (2 meters) between you and others  · Wear a face covering (mask) when needed  Use a disposable non-medical mask, or make a cloth covering with at least 2 layers  You can also create layers by putting a cloth covering over a disposable non-medical mask  Cover your mouth and your nose  Continue social distancing and washing your hands often  Do not put a face shield or covering on your   These increase the risk for sudden infant death syndrome (SIDS)  · Clean and disinfect high-touch surfaces and objects often  Use disinfecting wipes, or make a solution of 4 teaspoons of bleach in 1 quart (4 cups) of water  · Stay home if you are sick or think you may have COVID-19  It is important to stay home if you are waiting for a testing appointment or for test results  · Avoid or limit close physical contact with anyone who does not live in your home  Do not shake hands with, hug, or kiss a person as a greeting  If you must use public transportation (such as a bus or subway), try to sit or stand away from others  Wear your face covering  · Avoid in-person gatherings and crowds  Attend virtually if possible  What you can do to have a healthy pregnancy:   · Keep all prenatal and  appointments  You may be able to have certain prenatal appointments without having to go into the provider's office  Some providers offer phone, video, or other types of appointments  You may also be able to get prescriptions for a few months at a time   This will help lower the number of trips you need to make to the pharmacy for refills  If you do need to go into your provider's office, take precautions  Put a face covering on before you go into the office  Do not stand or sit within 6 feet (2 meters) of anyone in the waiting room, if possible  Do not stand or sit near anyone who is not wearing a face covering  · Get recommended vaccines  Your healthcare provider can tell you if you need vaccines not listed below, and when to get them  ? COVID-19 vaccines are given as a shot in 1 or 2 doses  Vaccination is recommended for everyone 5 years or older  One 2-dose vaccine is fully approved for those 12 or older  This vaccine also has an emergency use authorization (EUA) for children 11to 13years old  No vaccine is currently available for children younger than 5 years  A booster (additional) dose is given to help the immune system continue to protect against severe COVID-19  § A booster is recommended for all adults 18 or older  The booster can be a different brand of the COVID-19 vaccine than you originally received  The timing for the booster depends on the type of vaccine you received:    § 1-dose vaccine: The booster is given at least 2 months after you received the vaccine  § 2-dose vaccine: The booster is given at least 6 months after the second dose   § A booster can be given to adolescents 12to 16years old  Only 1 COVID-19 vaccine has an EUA for adolescent boosters  The booster is given at least 6 months after the second dose of the original vaccine series  · Get the influenza (flu) vaccine  Try to get the vaccine as soon as recommended each year, usually starting in September or October  · Get the Tdap vaccine  The Tdap vaccine protects you from tetanus, diphtheria, and pertussis  If possible, get the vaccine during weeks 27 to 36 of your pregnancy  You should get a dose of Tdap with each pregnancy      Take prenatal vitamins as directed  Your prenatal vitamins should contain folic acid  You need about 600 micrograms (mcg) of folic acid each day during pregnancy  Folic acid helps to form your baby's brain and spinal cord in early pregnancy  Eat a variety of healthy foods  Healthy foods are important, even if you take a prenatal vitamin  Healthy foods contain nutrients that help keep your immune system strong  Examples of healthy foods include vegetables, fruits, whole-grain breads and cereals, lean meats and poultry, fish, low-fat dairy products, and cooked beans  Do not have raw, undercooked, or unpasteurized food or drinks  Unpasteurized foods are foods that have not gone through the heating process (pasteurization) that destroys bacteria  Your healthcare provider or a dietitian can help you create healthy meal plans  Talk to your healthcare provider about exercise  Moderate exercise can help keep your immune system strong  Your healthcare provider can help you plan an exercise program that is safe for you during pregnancy  You may need to exercise at home if you cannot exercise outdoors, such as walking in a park  If you want to do pregnancy yoga or other group activities, be safe  Stay at least 6 feet (2 meters) away from others in the class, and the instructor  Wash your hands before you leave the building  Follow the facility's instructions for preventing infections  Try to lower your stress  You may be feeling more stressed than usual because of the COVID-19 outbreak  You may also feel stress from not being able to share your pregnancy with others  For example, you may not be able to have someone with you during prenatal visits or ultrasounds  Talk to your healthcare providers about ways to manage stress during this time  Pick 1 or 2 times a day to watch the news  Constant news watching about COVID-19 can increase your stress levels   Set a sleep schedule to go to bed and wake up at the same times each day     Do not smoke cigarettes, drink alcohol, or use drugs  Nicotine and other chemicals in cigarettes and cigars can harm your baby and your health  Alcohol can increase your risk for a miscarriage  Your baby may also be born too small or have other health problems  Certain drugs can be passed to your baby before he or she is born  Some can be passed through breast milk  It is best to quit cigarettes, alcohol, and drugs before you become pregnant and not start again after your baby is born  Ask your healthcare provider for information if you currently use any of these and need help to quit  Protect your  during delivery and while you are in the hospital:  It is not known for sure if an unborn baby can be infected with the virus that causes COVID-19  Some newborns have tested positive for the virus  The newborns may have been infected before, during, or after birth  The greatest risk is for a  to be in close contact with an infected person  Your baby may be tested for the virus soon after being born if you have COVID-23  He or she may be tested again before you leave the hospital  This depends on whether your baby has any signs or symptoms of COVID-19  You will be able to make choices for you and your baby during your hospital stay  Talk to healthcare providers about the following:  · Ask about temporary separation if you have COVID-19  Temporary separation means your  is moved to a different room from you  You will be able to make the decision if you want to do this  Separation will help lower your 's risk for being infected  You will still be able to give your  breast milk  You may need to pump the milk  Someone who does not have COVID-19 will then feed the pumped milk to your   You may instead choose to have your baby brought to you when you want to breastfeed  Wash your hands and the skin around your nipples before you hold your baby   Wear a face covering while you breastfeed  · Be careful if you have COVID-19 and do not choose temporary separation  Healthcare providers will keep your  at least 6 feet (2 meters) away from you as much as possible  Your  may be placed in an incubator  The incubator will help protect your  from infection  Always wash your hands and put on a face covering when you hold, touch, or have close contact with your   · Ask about visitors  The facility may not be allowing any visitors to newborns during this time  If you are allowed visitors, you may need to limit how many you can have at a time  Do not allow anyone who has known or suspected COVID-19 to visit  Even without signs or symptoms, the person can infect your  or others in the room  All visitors need to wash their hands and put on clean face coverings before entering your room  The face covering needs to stay on during the whole visit  Do not let anyone take the face covering down to make faces at your baby, talk, sneeze, or cough  Do not let anyone kiss you or your baby  Protect your  at home:   · You can choose to continue temporary separation if you have COVID-19  You can do this if an adult who does not have COVID-19 can care for your   Your healthcare provider can give you instructions on how to do this safely at home  Only have close contact with your  when needed  Remember to wash your hands and put on a clean face covering first  You may need to continue pumping your breast milk  A healthy adult can feed the pumped breast milk to your   You may instead choose to have your baby brought to you when you want to breastfeed  Take precautions to keep your baby safe  Wash your hands and the skin around your nipples before you hold your baby  You will also need to wear a face covering while you breastfeed  · Use face coverings safely    Everyone who has COVID-19 needs to wear a clean face covering while being within 6 feet (2 meters) of your   This includes other children in your home who are 2 years or older  Do not put a face covering or plastic face shield on your   Any covering increases your 's risk for sudden infant death syndrome (SIDS)  Do not use coverings on children younger than 2 years or on anyone who has breathing problems or cannot remove it  · Be careful about visitors  Continue precautions you used in the hospital  Do not allow anyone who has known or suspected COVID-19 to come over to see your   Have visitors put on clean face coverings before they enter your home  Have them wash their hands as soon as they come in  The face covering needs to stay on during the whole visit  · Keep all checkup appointments  You may be able to have some appointments by phone or video meeting  Other appointments will need to be in person, such as for vaccines  Vaccines are normally given to babies at certain ages  Until COVID-19 is under control, your 's provider will give you a vaccine schedule  It is important for your  to get all recommended vaccines  What you need to know about breastfeeding:  Breastfeeding for the first 6 months decreases your baby's risk for respiratory (lung) infections, allergies, asthma, and stomach problems  Breast milk also helps your baby develop a strong immune system  Breast milk is considered safe, even if you have COVID-19  Experts currently believe the virus that causes COVID-19 does not spread in breast milk  Do the following to help protect your baby:  · Wash your hands before every breastfeeding or pumping session  Even if you do not have COVID-19, you can transfer the virus from your hands to your baby or the pump  Use soap and water to wash your hands whenever possible  Use hand  that contains alcohol if soap and water are not available  · Clean and sanitize your breast pump after each use    Follow the 's directions for cleaning and sanitizing the pump  It is important not to use it until it is clean and sanitized  · If you have COVID-19:      ? Wear a face covering while you breastfeed or pump  This will help prevent you from passing the virus through droplets when you talk, cough, sneeze, or sing  The virus can stay on surfaces such as a breast pump for hours to days  ? Have someone who is not infected bottle feed your baby, if possible  Have the person wash his or her hands with soap and water before each feeding  The person can feed your  pumped breast milk or formula  Follow up with your doctor or obstetrician as directed:  Write down your questions so you remember to ask them during your visits  For more information:   · Centers for Disease Control and Prevention  [address] , [address]  Phone: 6- 341 - 060-7840  Web Address: [employer] com      [employer]  Information is for End User's use only and may not be sold, redistributed or otherwise used for commercial purposes  All illustrations and images included in CareNotes® are the copyrighted property of [employer] D [employer] M , Inc  or [employer]   The above information is an  only  It is not intended as medical advice for individual conditions or treatments  Talk to your doctor, nurse or pharmacist before following any medical regimen to see if it is safe and effective for you  Kick Counts in Pregnancy   AMBULATORY CARE:   Kick counts  measure how much your baby is moving in your womb  A kick from your baby can be felt as a twist, turn, swish, roll, or jab  It is common to feel your baby kicking at 26 to 28 weeks of pregnancy  You may feel your baby kick as early as 20 weeks of pregnancy  You may want to start counting at 28 weeks  Contact your doctor immediately if:   · You feel a change in the number of kicks or movements of your baby  · You feel fewer than 10 kicks within 2 hours  · You have questions or concerns about your baby's movements  Why measure kick counts:  Your baby's movement may provide information about your baby's health  He or she may move less, or not at all, if there are problems  Your baby may move less if he or she is not getting enough oxygen or nutrition from the placenta  Do not smoke while you are pregnant  Smoking decreases the amount of oxygen that gets to your baby  Talk to your healthcare provider if you need help to quit smoking  Tell your healthcare provider as soon as you feel a change in your baby's movements  When to measure kick counts:   · Measure kick counts at the same time every day  · Measure kick counts when your baby is awake and most active  Your baby may be most active in the evening  How to measure kick counts:  Check that your baby is awake before you measure kick counts  You can wake up your baby by lightly pushing on your belly, walking, or drinking something cold  Your healthcare provider may tell you different ways to measure kick counts  You may be told to do the following:  · Use a chart or clock to keep track of the time you start and finish counting  · Sit in a chair or lie on your left side  · Place your hands on the largest part of your belly  · Count until you reach 10 kicks  Write down how much time it takes to count 10 kicks  · It may take 30 minutes to 2 hours to count 10 kicks  It should not take more than 2 hours to count 10 kicks  Follow up with your doctor as directed:  Write down your questions so you remember to ask them during your visits  © Copyright [employer] 2022 Information is for End User's use only and may not be sold, redistributed or otherwise used for commercial purposes  All illustrations and images included in CareNotes® are the copyrighted property of [employer] M , Inc  or Yazmin [employer]   The above information is an  only   It is not intended as medical advice for individual conditions or treatments  Talk to your doctor, nurse or pharmacist before following any medical regimen to see if it is safe and effective for you  Pregnancy at 31 to [address]:   What changes are happening with my body? You may continue to have symptoms such as shortness of breath, heartburn, contractions, or swelling of your ankles and feet  You may be gaining about 1 pound a week now  How do I care for myself at this stage of my pregnancy? · Eat a variety of healthy foods  Healthy foods include fruits, vegetables, whole-grain breads, low-fat dairy foods, beans, lean meats, and fish  Drink liquids as directed  Ask how much liquid to drink each day and which liquids are best for you  Limit caffeine to less than 200 milligrams each day  Limit your intake of fish to 2 servings each week  Choose fish low in mercury such as canned light tuna, shrimp, salmon, cod, or tilapia  Do not  eat fish high in mercury such as swordfish, tilefish, naty mackerel, and shark  · Manage heartburn  by eating 4 or 5 small meals each day instead of large meals  Avoid spicy food  · Manage swelling  by lying down and putting your feet up  · Take prenatal vitamins as directed  Your need for certain vitamins and minerals, such as folic acid, increases during pregnancy  Prenatal vitamins provide some of the extra vitamins and minerals you need  Prenatal vitamins may also help to decrease the risk of certain birth defects  · Talk to your healthcare provider about exercise  Moderate exercise can help you stay fit  Your healthcare provider will help you plan an exercise program that is safe for you during pregnancy  · Do not smoke  Smoking increases your risk of a miscarriage and other health problems during your pregnancy  Smoking can cause your baby to be born too early or weigh less at birth   Ask your healthcare provider for information if you need help quitting  · Do not drink alcohol  Alcohol passes from your body to your baby through the placenta  It can affect your baby's brain development and cause fetal alcohol syndrome (FAS)  FAS is a group of conditions that causes mental, behavior, and growth problems  · Talk to your healthcare provider before you take any medicines  Many medicines may harm your baby if you take them when you are pregnant  Do not take any medicines, vitamins, herbs, or supplements without first talking to your healthcare provider  Never use illegal or street drugs (such as marijuana or cocaine) while you are pregnant  What are some safety tips during pregnancy? · Avoid hot tubs and saunas  Do not use a hot tub or sauna while you are pregnant, especially during your first trimester  Hot tubs and saunas may raise your baby's temperature and increase the risk of birth defects  · Avoid toxoplasmosis  This is an infection caused by eating raw meat or being around infected cat feces  It can cause birth defects, miscarriages, and other problems  Wash your hands after you touch raw meat  Make sure any meat is well-cooked before you eat it  Avoid raw eggs and unpasteurized milk  Use gloves or ask someone else to clean your cat's litter box while you are pregnant  What changes are happening with my baby? By 34 weeks, your baby may weigh more than 5 pounds  Your baby will be about 12 ½ inches long from the top of the head to the rump (baby's bottom)  Your baby is gaining about ½ pound a week  Your baby's eyes open and close now  Your baby's kicks and movements are more forceful at this time  What do I need to know about prenatal care? Your healthcare provider will check your blood pressure and weight  You may also need the following:  · A urine test  may also be done to check for sugar and protein  These can be signs of gestational diabetes or infection  Protein in your urine may also be a sign of preeclampsia   Preeclampsia is a condition that can develop during week 20 or later of your pregnancy  It causes high blood pressure, and it can cause problems with your kidneys and other organs  · A gestational diabetes screen  may be done  Your healthcare provider may order either a 1-step or 2-step oral glucose tolerance test (OGTT)  ? 1-step OGTT:  Your blood sugar level will be tested after you have not eaten for 8 hours (fasting)  You will then be given a glucose drink  Your level will be tested again 1 hour and 2 hours after you finish the drink  ? 2-step OGTT:  You do not have to fast for the first part of the test  You will have the glucose drink at any time of day  Your blood sugar level will be checked 1 hour later  If your blood sugar is higher than a certain level, another test will be ordered  You will fast and your blood sugar level will be tested  You will have the glucose drink  Your blood will be tested again 1 hour, 2 hours, and 3 hours after you finish the glucose drink  · A Tdap vaccine  may be recommended by your healthcare provider  · Fundal height  is a measurement of your uterus to check your baby's growth  This number is usually the same as the number of weeks that you have been pregnant  Your healthcare provider may also check your baby's position  · Your baby's heart rate  will be checked  When should I seek immediate care? · You develop a severe headache that does not go away  · You have new or increased vision changes, such as blurred or spotted vision  · You have new or increased swelling in your face or hands  · You have vaginal spotting or bleeding  · Your water broke or you feel warm water gushing or trickling from your vagina  When should I call my obstetrician? · You have more than 5 contractions in 1 hour  · You notice any changes in your baby's movements  · You have abdominal cramps, pressure, or tightening      · You have a change in vaginal discharge  · You have chills or a fever  · You have vaginal itching, burning, or pain  · You have yellow, green, white, or foul-smelling vaginal discharge  · You have pain or burning when you urinate, less urine than usual, or pink or bloody urine  · You have questions or concerns about your condition or care  CARE AGREEMENT:   You have the right to help plan your care  Learn about your health condition and how it may be treated  Discuss treatment options with your healthcare providers to decide what care you want to receive  You always have the right to refuse treatment  The above information is an  only  It is not intended as medical advice for individual conditions or treatments  Talk to your doctor, nurse or pharmacist before following any medical regimen to see if it is safe and effective for you  © Copyright [employer] 2022 Information is for End User's use only and may not be sold, redistributed or otherwise used for commercial purposes   All illustrations and images included in CareNotes® are the copyrighted property of A D A M , Inc  or [address] [place] [address]

## 2022-05-05 ENCOUNTER — ROUTINE PRENATAL (OUTPATIENT)
Dept: OBGYN CLINIC | Facility: MEDICAL CENTER | Age: 32
End: 2022-05-05

## 2022-05-05 VITALS
WEIGHT: 167 LBS | DIASTOLIC BLOOD PRESSURE: 64 MMHG | HEIGHT: 62 IN | SYSTOLIC BLOOD PRESSURE: 102 MMHG | BODY MASS INDEX: 30.73 KG/M2

## 2022-05-05 DIAGNOSIS — O99.810 ABNORMAL GLUCOSE TOLERANCE TEST (GTT) DURING PREGNANCY, ANTEPARTUM: ICD-10-CM

## 2022-05-05 DIAGNOSIS — O99.119 THROMBOCYTOPENIA COMPLICATING PREGNANCY (HCC): Primary | ICD-10-CM

## 2022-05-05 DIAGNOSIS — Z3A.31 31 WEEKS GESTATION OF PREGNANCY: ICD-10-CM

## 2022-05-05 DIAGNOSIS — D69.6 THROMBOCYTOPENIA COMPLICATING PREGNANCY (HCC): Primary | ICD-10-CM

## 2022-05-05 PROCEDURE — PNV: Performed by: NURSE PRACTITIONER

## 2022-05-05 NOTE — PROGRESS NOTES
Denies loss of fluid, vaginal bleeding and abdominal pain  Confirms frequent fetal movement  Doing fetal kick counts  Tolerating prenatal vitamin well  Reviewed 28 week labs significant for elevated 1 hour glucose and thrombocytopenia  Patient has repeat CBC ordered and follow-up with Hematology scheduled  3 hour glucose reviewed and ordered today  Patient plans to go Saturday  Denies other questions or concerns at today's visit  Patient plans include epidural for pain control during labor, breastfeeding, condoms for postpartum contraception and circumcision for infant  /64  Weight: +37lb  Plan  -continue prenatal vitamins daily  -continue fetal kick counts daily  -abnormal 1 hour glucose 3 hour glucose ordered and reviewed  -thrombocytopenia has repeat CBC ordered and follow-up with Hematology scheduled  -birth plan reviewed  Breast pump ordered  -common discomforts of pregnancy and precautions including  labor reviewed  Signs and symptoms report reviewed    Written information provided about COVID 19  RTO 2 weeks f/u labs

## 2022-05-06 ENCOUNTER — APPOINTMENT (OUTPATIENT)
Dept: LAB | Facility: HOSPITAL | Age: 32
End: 2022-05-06
Payer: COMMERCIAL

## 2022-05-06 DIAGNOSIS — Z3A.31 31 WEEKS GESTATION OF PREGNANCY: ICD-10-CM

## 2022-05-06 DIAGNOSIS — O99.810 ABNORMAL GLUCOSE TOLERANCE TEST (GTT) DURING PREGNANCY, ANTEPARTUM: ICD-10-CM

## 2022-05-06 LAB
GLUCOSE 1H P 100 G GLC PO SERPL-MCNC: 179 MG/DL (ref 65–179)
GLUCOSE 2H P 100 G GLC PO SERPL-MCNC: 136 MG/DL (ref 65–154)
GLUCOSE 3H P 100 G GLC PO SERPL-MCNC: 126 MG/DL (ref 65–139)
GLUCOSE P FAST SERPL-MCNC: 81 MG/DL (ref 65–99)

## 2022-05-06 PROCEDURE — 82952 GTT-ADDED SAMPLES: CPT

## 2022-05-06 PROCEDURE — 82951 GLUCOSE TOLERANCE TEST (GTT): CPT

## 2022-05-06 PROCEDURE — 36415 COLL VENOUS BLD VENIPUNCTURE: CPT

## 2022-05-19 ENCOUNTER — ROUTINE PRENATAL (OUTPATIENT)
Dept: OBGYN CLINIC | Facility: MEDICAL CENTER | Age: 32
End: 2022-05-19

## 2022-05-19 VITALS — SYSTOLIC BLOOD PRESSURE: 100 MMHG | WEIGHT: 165 LBS | BODY MASS INDEX: 30.18 KG/M2 | DIASTOLIC BLOOD PRESSURE: 70 MMHG

## 2022-05-19 DIAGNOSIS — F41.9 ANXIETY IN PREGNANCY IN THIRD TRIMESTER, ANTEPARTUM: ICD-10-CM

## 2022-05-19 DIAGNOSIS — O99.343 ANXIETY IN PREGNANCY IN THIRD TRIMESTER, ANTEPARTUM: ICD-10-CM

## 2022-05-19 DIAGNOSIS — Z3A.33 33 WEEKS GESTATION OF PREGNANCY: ICD-10-CM

## 2022-05-19 DIAGNOSIS — O26.00 EXCESSIVE WEIGHT GAIN AFFECTING PREGNANCY: ICD-10-CM

## 2022-05-19 DIAGNOSIS — Z34.03 ENCOUNTER FOR SUPERVISION OF NORMAL FIRST PREGNANCY IN THIRD TRIMESTER: Primary | ICD-10-CM

## 2022-05-19 PROCEDURE — PNV: Performed by: STUDENT IN AN ORGANIZED HEALTH CARE EDUCATION/TRAINING PROGRAM

## 2022-05-19 NOTE — PATIENT INSTRUCTIONS
Please note, you must call to schedule your [facility]  appointment  Appointments may be scheduled by calling the [facility] office at any time at [phone]  The scheduling personnel will help you choose a date and location that works for you  Thank you!

## 2022-05-19 NOTE — ASSESSMENT & PLAN NOTE
- Recommend third trimester growth US for weight gain > 35 lbs at 33 weeks    Of note, 1 hour was elevated at 164, but 3 hour glucose was normal

## 2022-05-19 NOTE — PROGRESS NOTES
Routine Prenatal Visit  [facility] [address] [place] [address]    Assessment/Plan:  Lorena Uriostegui is a 29y o  year old  at 33w1d who presents for routine prenatal visit  1  Encounter for supervision of normal first pregnancy in third trimester    2  33 weeks gestation of pregnancy  Assessment & Plan:  - Reviewed elevated 1 hour normal 3 hour glucose      3  Anxiety in pregnancy in third trimester, antepartum  Assessment & Plan:  - Patient endorses new anxiety in the last 2 weeks  - FILIPPO-7 score was 11  Denies thoughts of self harm  - Amenable to referral to behavioral health therapist     Orders:  -     Ambulatory Referral to [facility]; Future    4  Excessive weight gain affecting pregnancy  Assessment & Plan:  - Recommend third trimester growth US for weight gain > 35 lbs at 33 weeks  Of note, 1 hour was elevated at 164, but 3 hour glucose was normal     Orders:  -     Ambulatory Referral to Maternal Fetal Medicine; Future; Expected date: 2022        Subjective:     CC: Prenatal care    Isa Mock is a 29 y o   female who presents for routine prenatal care at 33w1d  Pregnancy ROS: Deneis leakage of fluid, pelvic pain, or vaginal bleeding  Reports normal fetal movement      The following portions of the patient's history were reviewed and updated as appropriate: allergies, current medications, past family history, past medical history, obstetric history, gynecologic history, past social history, past surgical history and problem list       Objective:  /70   Wt 74 8 kg (165 lb)   LMP 2021   BMI 30 18 kg/m²   Pregravid Weight/BMI: 59 kg (130 lb) (BMI 23 77)  Current Weight: 74 8 kg (165 lb)   Total Weight Gain: 15 9 kg (35 lb)   Pre-Shen Vitals    Flowsheet Row Most Recent Value   Prenatal Assessment    Fetal Heart Rate 135   Movement Present   Prenatal Vitals    Blood Pressure 100/70   Weight - Scale 74 8 kg (165 lb) Urine Albumin/Glucose    Dilation/Effacement/Station    Vaginal Drainage    Edema    LLE Edema Trace   RLE Edema Trace           General: Well appearing, no distress  Respiratory: Unlabored breathing  Cardiovascular: Regular rate  Abdomen: Soft, gravid, nontender  Fundal Height: Appropriate for gestational age  Extremities: Warm and well perfused  Non tender      Marcos MD [place]  [facility]  5/19/2022 12:57 PM

## 2022-05-19 NOTE — ASSESSMENT & PLAN NOTE
- Patient endorses new anxiety in the last 2 weeks  - FILIPPO-7 score was 11  Denies thoughts of self harm    - Amenable to referral to behavioral health therapist

## 2022-05-20 ENCOUNTER — TELEPHONE (OUTPATIENT)
Dept: PSYCHIATRY | Facility: CLINIC | Age: 32
End: 2022-05-20

## 2022-05-21 NOTE — PATIENT INSTRUCTIONS
Thank you for choosing us for your  care today  If you have any questions about your ultrasound or care, please do not hesitate to contact us or your primary obstetrician  Some general instructions for your pregnancy are:    Protect against coronavirus: get vaccinated - pregnant women are increased risk of severe COVID  Notify your primary care doctor if you have any symptoms  Exercise: Aim for 22 minutes per day (150 minutes per week) of regular exercise  Walking is great! Nutrition: aim for calcium-rich and iron-rich foods as well as healthy sources of protein  Learn about Preeclampsia: preeclampsia is a common, serious high blood pressure complication in pregnancy  A blood pressure of 671HVZB (systolic or top number) or 93QSQB (diastolic or bottom number) is not normal and needs evaluation by your doctor  Aspirin is sometimes prescribed in early pregnancy to prevent preeclampsia in women with risk factors - ask your obstetrician if you should be on this medication  If you smoke, try to reduce how many cigarettes you smoke or try to quit completely  Do not vape  Other warning signs to watch out for in pregnancy or postpartum: chest pain, obstructed breathing or shortness of breath, seizures, thoughts of hurting yourself or your baby, bleeding, a painful or swollen leg, fever, or headache (see [facility] POST-BIRTH Warning Signs campaign)  If these happen call 911  Itching is also not normal in pregnancy and if you experience this, especially over your hands and feet, potentially worse at night, notify your doctors

## 2022-05-26 ENCOUNTER — ULTRASOUND (OUTPATIENT)
Dept: PERINATAL CARE | Facility: CLINIC | Age: 32
End: 2022-05-26
Payer: COMMERCIAL

## 2022-05-26 VITALS
HEIGHT: 62 IN | BODY MASS INDEX: 30.62 KG/M2 | DIASTOLIC BLOOD PRESSURE: 74 MMHG | SYSTOLIC BLOOD PRESSURE: 117 MMHG | WEIGHT: 166.4 LBS | HEART RATE: 81 BPM

## 2022-05-26 DIAGNOSIS — Z3A.33 33 WEEKS GESTATION OF PREGNANCY: ICD-10-CM

## 2022-05-26 DIAGNOSIS — Z3A.34 34 WEEKS GESTATION OF PREGNANCY: ICD-10-CM

## 2022-05-26 DIAGNOSIS — D69.6 THROMBOCYTOPENIA COMPLICATING PREGNANCY (HCC): Primary | ICD-10-CM

## 2022-05-26 DIAGNOSIS — O26.00 EXCESSIVE WEIGHT GAIN AFFECTING PREGNANCY: ICD-10-CM

## 2022-05-26 DIAGNOSIS — Z36.89 ENCOUNTER FOR ULTRASOUND TO CHECK FETAL GROWTH: ICD-10-CM

## 2022-05-26 DIAGNOSIS — O99.119 THROMBOCYTOPENIA COMPLICATING PREGNANCY (HCC): Primary | ICD-10-CM

## 2022-05-26 PROCEDURE — 99213 OFFICE O/P EST LOW 20 MIN: CPT | Performed by: OBSTETRICS & GYNECOLOGY

## 2022-05-26 PROCEDURE — 3008F BODY MASS INDEX DOCD: CPT | Performed by: INTERNAL MEDICINE

## 2022-05-26 PROCEDURE — 76816 OB US FOLLOW-UP PER FETUS: CPT | Performed by: OBSTETRICS & GYNECOLOGY

## 2022-05-27 ENCOUNTER — TELEPHONE (OUTPATIENT)
Dept: PSYCHIATRY | Facility: CLINIC | Age: 32
End: 2022-05-27

## 2022-05-29 NOTE — PROGRESS NOTES
[address] [facility] [address]: Ms Robbie Hernandez was seen today for fetal growth assessment ultrasound  See ultrasound report under "OB Procedures" tab  The time spent on this established patient on the encounter date included 5 minutes previsit service time reviewing records and precharting, 10 minutes face-to-face service time counseling regarding results and coordinating care, and  5 minutes charting, totalling 20 minutes  Please don't hesitate to contact our office with any concerns or questions    Houston Zurita MD

## 2022-06-01 PROBLEM — Z3A.35 35 WEEKS GESTATION OF PREGNANCY: Status: ACTIVE | Noted: 2022-01-03

## 2022-06-01 NOTE — ASSESSMENT & PLAN NOTE
- reviewed s/s/ labor, [facility]  - GBS today  - will recheck fetal position at 36 weeks  - Next visit 1 week

## 2022-06-01 NOTE — ASSESSMENT & PLAN NOTE
- EFW at 34 w 1d -  EFW Hadlock 4   2706 grams - 5 lbs 15 oz                 (85%)  - Reviewed results of growth scan-  Fiordaliza Lowe desires to await onset of labor       RESULTS     Fetus # 1 of 1  Transverse presentation  Fetal growth appeared normal  Placenta Location = Anterior  Placenta Grade = II     MEASUREMENTS (* Included In Average GA)     AC              32 2 cm        36 weeks 1 day * (95%)  BPD              8 8 cm        35 weeks 4 days* (85%)  HC              33 4 cm        38 weeks 1 day * (>95%)  Femur            6 5 cm        33 weeks 2 days* (21%)     Cerebellum       4 9 cm        36 weeks 2 days     EFW Hadlock 4   2706 grams - 5 lbs 15 oz                 (85%)     THE AVERAGE GESTATIONAL AGE is 35 weeks 6 days +/- 21 days      AMNIOTIC FLUID     Q1: 4 2      Q2: 4 6      Q3: 4 9      Q4: 2 0  GEORGE Total = 15 7 cm  Amniotic Fluid: Normal

## 2022-06-01 NOTE — ASSESSMENT & PLAN NOTE
- Visit with Behavioral health scheduled for 6/9/22  -  Has support at home  - She is aware of triggers

## 2022-06-02 ENCOUNTER — ROUTINE PRENATAL (OUTPATIENT)
Dept: OBGYN CLINIC | Facility: MEDICAL CENTER | Age: 32
End: 2022-06-02

## 2022-06-02 VITALS — WEIGHT: 168 LBS | SYSTOLIC BLOOD PRESSURE: 108 MMHG | BODY MASS INDEX: 30.73 KG/M2 | DIASTOLIC BLOOD PRESSURE: 70 MMHG

## 2022-06-02 DIAGNOSIS — Z3A.35 35 WEEKS GESTATION OF PREGNANCY: ICD-10-CM

## 2022-06-02 DIAGNOSIS — O26.00 EXCESSIVE WEIGHT GAIN AFFECTING PREGNANCY: ICD-10-CM

## 2022-06-02 DIAGNOSIS — Z34.93 THIRD TRIMESTER PREGNANCY: Primary | ICD-10-CM

## 2022-06-02 DIAGNOSIS — F41.9 ANXIETY IN PREGNANCY IN THIRD TRIMESTER, ANTEPARTUM: ICD-10-CM

## 2022-06-02 DIAGNOSIS — O99.343 ANXIETY IN PREGNANCY IN THIRD TRIMESTER, ANTEPARTUM: ICD-10-CM

## 2022-06-02 PROCEDURE — 87150 DNA/RNA AMPLIFIED PROBE: CPT | Performed by: ADVANCED PRACTICE MIDWIFE

## 2022-06-02 PROCEDURE — PNV: Performed by: ADVANCED PRACTICE MIDWIFE

## 2022-06-02 NOTE — PROGRESS NOTES
Routine Prenatal Visit  [facility] [address] [place] [address]    Assessment/Plan:  Lio Lin is a 29y o  year old  at 35w1d who presents for routine prenatal visit  1  Third trimester pregnancy  -     Strep B DNA probe, amplification    2  35 weeks gestation of pregnancy  Assessment & Plan:  - reviewed s/s/ labor, [facility]  - GBS today  - will recheck fetal position at 36 weeks  - Next visit 1 week  Orders:  -     Strep B DNA probe, amplification    3  Anxiety in pregnancy in third trimester, antepartum  Assessment & Plan:  - Visit with Behavioral health scheduled for 22  -  Has support at home  - She is aware of triggers       4  Excessive weight gain affecting pregnancy  Assessment & Plan:  - EFW at 34 w 1d -  EFW Hadlock 4   2706 grams - 5 lbs 15 oz                 (85%)  - Reviewed results of growth scan-  Lio Lin desires to await onset of labor  RESULTS     Fetus # 1 of 1  Transverse presentation  Fetal growth appeared normal  Placenta Location = Anterior  Placenta Grade = II     MEASUREMENTS (* Included In Average GA)     AC              32 2 cm        36 weeks 1 day * (95%)  BPD              8 8 cm        35 weeks 4 days* (85%)  HC              33 4 cm        38 weeks 1 day * (>95%)  Femur            6 5 cm        33 weeks 2 days* (21%)     Cerebellum       4 9 cm        36 weeks 2 days     EFW Hadlock 4   2706 grams - 5 lbs 15 oz                 (85%)     THE AVERAGE GESTATIONAL AGE is 35 weeks 6 days +/- 21 days      AMNIOTIC FLUID     Q1: 4 2      Q2: 4 6      Q3: 4 9      Q4: 2 0  GEORGE Total = 15 7 cm  Amniotic Fluid: Normal               Subjective:     CC: Prenatal care    Klaus Mao is a 29 y o   female who presents for routine prenatal care at 35w1d  Pregnancy ROS: No  leakage of fluid, pelvic pain, or vaginal bleeding  Good fetal movement  Reviewed u/s and growth pattern   At this time Lio Delia would like to await onset of spontaneous labor  Does not desire additional growth scan  Baby was in transverse position on u/s  Will follow-up at 36 weeks with position check  The following portions of the patient's history were reviewed and updated as appropriate: allergies, current medications, past family history, past medical history, obstetric history, gynecologic history, past social history, past surgical history and problem list       Objective:  /70   Wt 76 2 kg (168 lb)   LMP 2021   BMI 30 73 kg/m²   Pregravid Weight/BMI: 59 kg (130 lb) (BMI 23 77)  Current Weight: 76 2 kg (168 lb)   Total Weight Gain: 17 2 kg (38 lb)   Pre-Shen Vitals    Flowsheet Row Most Recent Value   Prenatal Assessment    Fundal Height (cm) 34 cm   Movement Present   Prenatal Vitals    Blood Pressure 108/70   Weight - Scale 76 2 kg (168 lb)   Urine Albumin/Glucose    Dilation/Effacement/Station    Vaginal Drainage    Edema    LLE Edema Trace   RLE Edema Trace           General: Well appearing, no distress  Respiratory: Unlabored breathing  Cardiovascular: Regular rate  Abdomen: Soft, gravid, nontender  Fundal Height: Appropriate for gestational age  Extremities: Warm and well perfused  Non tender

## 2022-06-04 LAB — GP B STREP DNA SPEC QL NAA+PROBE: NEGATIVE

## 2022-06-08 ENCOUNTER — ROUTINE PRENATAL (OUTPATIENT)
Dept: OBGYN CLINIC | Facility: MEDICAL CENTER | Age: 32
End: 2022-06-08
Payer: COMMERCIAL

## 2022-06-08 DIAGNOSIS — Z34.83 ENCOUNTER FOR SUPERVISION OF OTHER NORMAL PREGNANCY IN THIRD TRIMESTER: Primary | ICD-10-CM

## 2022-06-08 DIAGNOSIS — D69.6 THROMBOCYTOPENIA COMPLICATING PREGNANCY (HCC): ICD-10-CM

## 2022-06-08 DIAGNOSIS — O99.119 THROMBOCYTOPENIA COMPLICATING PREGNANCY (HCC): ICD-10-CM

## 2022-06-08 DIAGNOSIS — Z3A.36 36 WEEKS GESTATION OF PREGNANCY: ICD-10-CM

## 2022-06-08 LAB
BASOPHILS # BLD AUTO: 0.03 THOUSANDS/ΜL (ref 0–0.1)
BASOPHILS NFR BLD AUTO: 1 % (ref 0–1)
EOSINOPHIL # BLD AUTO: 0.06 THOUSAND/ΜL (ref 0–0.61)
EOSINOPHIL NFR BLD AUTO: 1 % (ref 0–6)
ERYTHROCYTE [DISTWIDTH] IN BLOOD BY AUTOMATED COUNT: 14.7 % (ref 11.6–15.1)
HCT VFR BLD AUTO: 38.9 % (ref 34.8–46.1)
HGB BLD-MCNC: 12.9 G/DL (ref 11.5–15.4)
IMM GRANULOCYTES # BLD AUTO: 0.04 THOUSAND/UL (ref 0–0.2)
IMM GRANULOCYTES NFR BLD AUTO: 1 % (ref 0–2)
LYMPHOCYTES # BLD AUTO: 1.32 THOUSANDS/ΜL (ref 0.6–4.47)
LYMPHOCYTES NFR BLD AUTO: 20 % (ref 14–44)
MCH RBC QN AUTO: 29.7 PG (ref 26.8–34.3)
MCHC RBC AUTO-ENTMCNC: 33.2 G/DL (ref 31.4–37.4)
MCV RBC AUTO: 89 FL (ref 82–98)
MONOCYTES # BLD AUTO: 0.58 THOUSAND/ΜL (ref 0.17–1.22)
MONOCYTES NFR BLD AUTO: 9 % (ref 4–12)
NEUTROPHILS # BLD AUTO: 4.6 THOUSANDS/ΜL (ref 1.85–7.62)
NEUTS SEG NFR BLD AUTO: 68 % (ref 43–75)
NRBC BLD AUTO-RTO: 0 /100 WBCS
PLATELET # BLD AUTO: 102 THOUSANDS/UL (ref 149–390)
PMV BLD AUTO: 13.1 FL (ref 8.9–12.7)
RBC # BLD AUTO: 4.35 MILLION/UL (ref 3.81–5.12)
WBC # BLD AUTO: 6.63 THOUSAND/UL (ref 4.31–10.16)

## 2022-06-08 PROCEDURE — 36415 COLL VENOUS BLD VENIPUNCTURE: CPT | Performed by: STUDENT IN AN ORGANIZED HEALTH CARE EDUCATION/TRAINING PROGRAM

## 2022-06-08 PROCEDURE — 76815 OB US LIMITED FETUS(S): CPT | Performed by: STUDENT IN AN ORGANIZED HEALTH CARE EDUCATION/TRAINING PROGRAM

## 2022-06-08 PROCEDURE — PNV: Performed by: STUDENT IN AN ORGANIZED HEALTH CARE EDUCATION/TRAINING PROGRAM

## 2022-06-08 PROCEDURE — 85025 COMPLETE CBC W/AUTO DIFF WBC: CPT | Performed by: STUDENT IN AN ORGANIZED HEALTH CARE EDUCATION/TRAINING PROGRAM

## 2022-06-08 NOTE — ASSESSMENT & PLAN NOTE
- GBS negative  - Vertex by ultrasound today  - Delivery Plan: Await spontaneous labor  - Feeding: Breastfeeding, [place] ordered today  - Pediatrician: Dr [facility]  - Immunizations: s/p influenza, Tdap, and COVID-19 vaccines  - Fetal kick counts and labor precautions reviewed

## 2022-06-08 NOTE — PATIENT INSTRUCTIONS
Breastfeeding Resources    www  [employer]  [facility] Breastfeeding Medicine  tim  com  [facility]

## 2022-06-08 NOTE — PROGRESS NOTES
Routine Prenatal Visit  [facility] [address] [place] [address]    Assessment/Plan:  Yaya Douglas is a 29y o  year old  at 36w0d who presents for routine prenatal visit  1  Encounter for supervision of other normal pregnancy in third trimester    2  36 weeks gestation of pregnancy  Assessment & Plan:  - GBS negative  - Vertex by ultrasound today  - Delivery Plan: Await spontaneous labor  - Feeding: Breastfeeding, [place] ordered today  - Pediatrician: [facility]  - Immunizations: s/p influenza, Tdap, and COVID-19 vaccines  - Fetal kick counts and labor precautions reviewed  3  Thrombocytopenia complicating pregnancy ([facility] [address] )  -     CBC and differential        Subjective:     CC: Prenatal care    Arden Mueller is a 29 y o   female who presents for routine prenatal care at 36w0d  Pregnancy ROS: Denies leakage of fluid, pelvic pain, or vaginal bleeding  Reports normal fetal movement  The following portions of the patient's history were reviewed and updated as appropriate: allergies, current medications, past family history, past medical history, obstetric history, gynecologic history, past social history, past surgical history and problem list       Objective:  LMP 2021   Pregravid Weight/BMI: 59 kg (130 lb) (BMI 23 77)  Current Weight:     Total Weight Gain: 17 2 kg (38 lb)        General: Well appearing, no distress  Respiratory: Unlabored breathing  Cardiovascular: Regular rate  Abdomen: Soft, gravid, nontender  Fundal Height: Appropriate for gestational age  Extremities: Warm and well perfused  Non tender        Vertex by ultrasound    Donny Coughlin MD  [address] [place] [address]  2022 4:59 PM

## 2022-06-09 ENCOUNTER — SOCIAL WORK (OUTPATIENT)
Dept: BEHAVIORAL/MENTAL HEALTH CLINIC | Facility: CLINIC | Age: 32
End: 2022-06-09
Payer: COMMERCIAL

## 2022-06-09 DIAGNOSIS — F41.9 ANXIETY IN PREGNANCY IN THIRD TRIMESTER, ANTEPARTUM: ICD-10-CM

## 2022-06-09 DIAGNOSIS — O99.343 ANXIETY IN PREGNANCY IN THIRD TRIMESTER, ANTEPARTUM: ICD-10-CM

## 2022-06-09 PROCEDURE — 90791 PSYCH DIAGNOSTIC EVALUATION: CPT | Performed by: SOCIAL WORKER

## 2022-06-09 NOTE — PSYCH
Assessment/Plan: Obtain background information     Diagnoses and all orders for this visit:    Anxiety in pregnancy in third trimester, antepartum  -     Ambulatory Referral to [facility]          Subjective:      Patient ID: Vincent Damon is a 29 y o  female  HPI:     Pre-morbid level of function and History of Present Illness: Onset during pregnancy  Previous Psychiatric/psychological treatment/year: NA  Current Psychiatrist/Therapist: SERGO  Outpatient and/or Partial and Other Community Resources Used (CTT, ICM, VNA): NA      Problem Assessment:     SOCIAL/VOCATION:  Family Constellation (include parents, relationship with each and pertinent Psych/Medical History):     Family History   Problem Relation Age of Onset    Depression Mother     Bipolar disorder Mother     Seizures Mother     Hypertension Father     Heart disease Father     Heart attack Father     Colon cancer Maternal Grandfather     Cancer Paternal Grandfather     Kidney cancer Paternal Grandfather     Stroke Paternal Grandfather     Heart attack Paternal Grandfather     No Known Problems Brother     Diabetes Maternal Grandmother     Hypertension Maternal Grandmother     Anxiety disorder Maternal Grandmother     Depression Maternal Grandmother     Heart attack Paternal Grandmother     No Known Problems Half-Brother     Anxiety disorder Half-Brother     Depression Half-Brother     Breast cancer Neg Hx     Ovarian cancer Neg Hx        Mother: Bipolar  Spouse: NA   Father: D&A   Children: Na   Sibling: NA   Sibling: Brother with anxiety & bipolar   Children: NA   Other: NA    Avel Jeans relates best to [place] and her youngest brother  she lives with Addis Evans  she does not live alone  Domestic Violence: No past history of domestic violence    Additional Comments related to family/relationships/peer support: Pt's partner, youngest brother and friends are supports       School or Work History (strengths/limitations/needs): Pt works two jobs    Her highest grade level achieved was Associates degree     history includes NA    Financial status includes NA    LEISURE ASSESSMENT (Include past and present hobbies/interests and level of involvement (Ex: Group/Club Affiliations): Hiking, reading, gardening  her primary language is [place]  Preferred language is [place]  Ethnic considerations are NA  Religions affiliations and level of involvement NA   Does spirituality help you cope? No    FUNCTIONAL STATUS: There has been a recent change in [address] ability to do the following: does not need can service    Level of Assistance Needed/By Whom?: NA    [address] learns best by  NA    SUBSTANCE ABUSE ASSESSMENT: no substance abuse    Substance/Route/Age/Amount/Frequency/Last Use: NA    DETOX HISTORY: NA    Previous detox/rehab treatment: NA    HEALTH ASSESSMENT: no referral to PCP needed    LEGAL: No Mental Health Advance Directive or Power of  on file    Prenatal History: N/A    Delivery History: N/A    Developmental Milestones: N/A  Temperament as an infant was N/A  Temperament as a toddler was N/A  Temperament at school age was N/A  Temperament as a teenager was N/A  Risk Assessment:   The following ratings are based on my observation of this patient over the last session    Risk of Harm to Self:   Demographic risk factors include   Historical Risk Factors include NA  Recent Specific Risk Factors include NA  Additional Factors for a Child or Adolescent gender: female (more likely to attempt)    Risk of Harm to Others:   Demographic Risk Factors include NA  Historical Risk Factors include NA  Recent Specific Risk Factors include NA    Access to Weapons:   [address] has access to the following weapons: None   The following steps have been taken to ensure weapons are properly secured: NA    Based on the above information, the client presents the following risk of harm to self or others: low    The following interventions are recommended:   no intervention changes    Notes regarding this Risk Assessment: NA        Review Of Systems:     Mood Anxiety   Behavior Normal    Thought Content Normal   General Normal    Personality Normal   Other Psych Symptoms Normal   Constitutional Normal   ENT Normal   Cardiovascular Normal    Respiratory Normal    Gastrointestinal Normal   Genitourinary Normal    Musculoskeletal Negative   Integumentary Normal    Neurological Normal    Endocrine Normal          Mental status:  Appearance calm and cooperative    Mood anxious   Affect affect appropriate    Speech a normal rate   Thought Processes normal thought processes   Hallucinations no hallucinations present    Thought Content no delusions   Abnormal Thoughts no suicidal thoughts  and no homicidal thoughts    Orientation  oriented to person and place and time   Remote Memory short term memory intact and long term memory intact   Attention Span concentration intact   Intellect Appears to be of Average Intelligence   Fund of Knowledge displays adequate knowledge of current events   Insight Insight intact   Judgement judgment was intact   Muscle Strength Muscle strength and tone were normal   Language no difficulty naming common objects   Pain none   Pain Scale 0   Assessment/Plan:      Diagnoses and all orders for this visit:    Anxiety in pregnancy in third trimester, antepartum  -     Ambulatory Referral to [facility]          Subjective:  Pt presented for initial therapy session  Met with pt from [address] [place] [address]  Pt provided all necessary background information  Pt grew up in [place] (moved around a lot) with her father, 2 half-brothers and 1 full brother  Pt's mother left when she was 6yo  Pt attempted to have a relationship with her mother when she was in her teens but this did not work out  Pt has very sporadic contact with her mother via text message only   Pt's relationship with her father and brothers is good  Family hx includes substance abuse in her father, anxiety & bipolar in one brother, bipolar in mother and MH issues (unknown) in [place]  Pt has an Associates degree and works two jobs  She works FT at [employer] (8p-8a, Sun, Mon & [address]) and PT at [employer] on Friday's from 8:30a-2pm   Pt enjoys hiking, reading and gardening  Pt resides with her boyfriend, Gila Jolley (together since 2016) who works in IT  He and pt have a good relationship with his family  Pt's supports are Gila Jolley and her youngest brother  She has friends that she can talk to as well  Pt is currently 36w1d pregnant with an WAYNE of 7/6/22  This was a planned pregnancy and pt plans to breast feed  She is having a boy  She will take 12 weeks maternity and then return to both jobs  Pt's MIL will be the primary caretaker while she and Gila Jolley are at work  Pt has been struggling with anxiety, difficulty sleeping, racing thoughts, and rapid HR  She is mostly concerned that she and Gila Jolley reside in a fixer-upper type home and the bedrooms upstairs are not renovated yet  The plan is/was to move into the master bedroom prior to the baby's arrival  Pt feels that Gila Jolley procrastinated on getting the work done and now [employer] time" to get it all done  Processed pt's feelings at length and encourage pt to focus on the things that she can control  Patient ID: Nicol Strauss is a 29 y o  female  HPI    Review of Systems  : pt was pleasant, cooperative and engaged      Objective:     Physical Exam

## 2022-06-15 PROBLEM — Z3A.37 37 WEEKS GESTATION OF PREGNANCY: Status: ACTIVE | Noted: 2022-01-03

## 2022-06-15 NOTE — ASSESSMENT & PLAN NOTE
- Reviewed s/s labor, [facility]  - Breastfeeding, Breast pump has not heard from company  - GBS negative  - postpartum birth control : condoms  - using PCP for infant care  - next visit 1 week

## 2022-06-16 ENCOUNTER — ROUTINE PRENATAL (OUTPATIENT)
Dept: OBGYN CLINIC | Facility: MEDICAL CENTER | Age: 32
End: 2022-06-16

## 2022-06-16 VITALS — BODY MASS INDEX: 31.09 KG/M2 | WEIGHT: 170 LBS | DIASTOLIC BLOOD PRESSURE: 74 MMHG | SYSTOLIC BLOOD PRESSURE: 102 MMHG

## 2022-06-16 DIAGNOSIS — Z3A.37 37 WEEKS GESTATION OF PREGNANCY: ICD-10-CM

## 2022-06-16 DIAGNOSIS — D69.6 THROMBOCYTOPENIA COMPLICATING PREGNANCY (HCC): ICD-10-CM

## 2022-06-16 DIAGNOSIS — F41.9 ANXIETY IN PREGNANCY IN THIRD TRIMESTER, ANTEPARTUM: ICD-10-CM

## 2022-06-16 DIAGNOSIS — O99.343 ANXIETY IN PREGNANCY IN THIRD TRIMESTER, ANTEPARTUM: ICD-10-CM

## 2022-06-16 DIAGNOSIS — Z34.93 THIRD TRIMESTER PREGNANCY: Primary | ICD-10-CM

## 2022-06-16 DIAGNOSIS — O99.119 THROMBOCYTOPENIA COMPLICATING PREGNANCY (HCC): ICD-10-CM

## 2022-06-16 PROCEDURE — PNV: Performed by: ADVANCED PRACTICE MIDWIFE

## 2022-06-16 NOTE — PROGRESS NOTES
Routine Prenatal Visit  [facility] [address] [place] [address]    Assessment/Plan:  Thiago Valentin is a 29y o  year old  at 37w1d who presents for routine prenatal visit  1  Third trimester pregnancy    2  37 weeks gestation of pregnancy  Assessment & Plan:  - Reviewed s/s labor, FKC  - Breastfeeding, Breast pump has not heard from company  - GBS negative  - postpartum birth control : condoms  - using PCP for infant care  - next visit 1 week  3  Anxiety in pregnancy in third trimester, antepartum  Assessment & Plan:  -Saw Behavioral health visit  and has visit scheduled on 22    - good support system      4  Thrombocytopenia complicating pregnancy ([facility] [address] )  Assessment & Plan:  Planned repeat CBC at 38-39 weeks          Subjective:     CC: Prenatal care    Hari Vaqsuez is a 29 y o   female who presents for routine prenatal care at 37w1d  Pregnancy ROS: no leakage of fluid, pelvic pain, or vaginal bleeding   good fetal movement  Reviewed s/s labor, when to call  Has not received breast pump- will place order again      The following portions of the patient's history were reviewed and updated as appropriate: allergies, current medications, past family history, past medical history, obstetric history, gynecologic history, past social history, past surgical history and problem list       Objective:  /74   Wt 77 1 kg (170 lb)   LMP 2021   BMI 31 09 kg/m²   Pregravid Weight/BMI: 59 kg (130 lb) (BMI 23 77)  Current Weight: 77 1 kg (170 lb)   Total Weight Gain: 18 1 kg (40 lb)   Pre-Shen Vitals    Flowsheet Row Most Recent Value   Prenatal Assessment    Fetal Heart Rate 142   Fundal Height (cm) 36 cm   Movement Present   Prenatal Vitals    Blood Pressure 102/74   Weight - Scale 77 1 kg (170 lb)   Urine Albumin/Glucose    Dilation/Effacement/Station    Vaginal Drainage    Edema    LLE Edema Trace   RLE Edema Trace           General: Well appearing, no distress  Respiratory: Unlabored breathing  Cardiovascular: Regular rate  Abdomen: Soft, gravid, nontender  Fundal Height: Appropriate for gestational age  Extremities: Warm and well perfused  Non tender

## 2022-06-21 PROBLEM — Z3A.38 38 WEEKS GESTATION OF PREGNANCY: Status: ACTIVE | Noted: 2022-01-03

## 2022-06-22 LAB
DME PARACHUTE DELIVERY DATE ACTUAL: NORMAL
DME PARACHUTE DELIVERY DATE EXPECTED: NORMAL
DME PARACHUTE DELIVERY DATE REQUESTED: NORMAL
DME PARACHUTE ITEM DESCRIPTION: NORMAL
DME PARACHUTE ORDER STATUS: NORMAL
DME PARACHUTE SUPPLIER NAME: NORMAL
DME PARACHUTE SUPPLIER PHONE: NORMAL

## 2022-06-22 NOTE — ASSESSMENT & PLAN NOTE
- Received message for  Breast pump  - PCP for infant care  - Reviewed s/s labor, FKC  - awaiting spontaneous labor  - Condoms for birth control  - Taking PNV, MG,

## 2022-06-23 ENCOUNTER — APPOINTMENT (OUTPATIENT)
Dept: LAB | Facility: MEDICAL CENTER | Age: 32
End: 2022-06-23
Payer: COMMERCIAL

## 2022-06-23 ENCOUNTER — SOCIAL WORK (OUTPATIENT)
Dept: BEHAVIORAL/MENTAL HEALTH CLINIC | Facility: CLINIC | Age: 32
End: 2022-06-23
Payer: COMMERCIAL

## 2022-06-23 ENCOUNTER — ROUTINE PRENATAL (OUTPATIENT)
Dept: OBGYN CLINIC | Facility: MEDICAL CENTER | Age: 32
End: 2022-06-23

## 2022-06-23 VITALS — DIASTOLIC BLOOD PRESSURE: 64 MMHG | BODY MASS INDEX: 31.24 KG/M2 | WEIGHT: 170.8 LBS | SYSTOLIC BLOOD PRESSURE: 106 MMHG

## 2022-06-23 DIAGNOSIS — O99.343 ANXIETY IN PREGNANCY IN THIRD TRIMESTER, ANTEPARTUM: ICD-10-CM

## 2022-06-23 DIAGNOSIS — F41.9 ANXIETY IN PREGNANCY IN THIRD TRIMESTER, ANTEPARTUM: Primary | ICD-10-CM

## 2022-06-23 DIAGNOSIS — Z3A.38 38 WEEKS GESTATION OF PREGNANCY: ICD-10-CM

## 2022-06-23 DIAGNOSIS — Z34.93 THIRD TRIMESTER PREGNANCY: ICD-10-CM

## 2022-06-23 DIAGNOSIS — O99.343 ANXIETY IN PREGNANCY IN THIRD TRIMESTER, ANTEPARTUM: Primary | ICD-10-CM

## 2022-06-23 DIAGNOSIS — D69.6 THROMBOCYTOPENIA COMPLICATING PREGNANCY (HCC): ICD-10-CM

## 2022-06-23 DIAGNOSIS — F41.9 ANXIETY IN PREGNANCY IN THIRD TRIMESTER, ANTEPARTUM: ICD-10-CM

## 2022-06-23 DIAGNOSIS — Z34.93 THIRD TRIMESTER PREGNANCY: Primary | ICD-10-CM

## 2022-06-23 DIAGNOSIS — O99.119 THROMBOCYTOPENIA COMPLICATING PREGNANCY (HCC): ICD-10-CM

## 2022-06-23 LAB
BASOPHILS # BLD AUTO: 0.02 THOUSANDS/ΜL (ref 0–0.1)
BASOPHILS NFR BLD AUTO: 0 % (ref 0–1)
EOSINOPHIL # BLD AUTO: 0.05 THOUSAND/ΜL (ref 0–0.61)
EOSINOPHIL NFR BLD AUTO: 1 % (ref 0–6)
ERYTHROCYTE [DISTWIDTH] IN BLOOD BY AUTOMATED COUNT: 15.1 % (ref 11.6–15.1)
HCT VFR BLD AUTO: 38.9 % (ref 34.8–46.1)
HGB BLD-MCNC: 12.6 G/DL (ref 11.5–15.4)
IMM GRANULOCYTES # BLD AUTO: 0.04 THOUSAND/UL (ref 0–0.2)
IMM GRANULOCYTES NFR BLD AUTO: 1 % (ref 0–2)
LYMPHOCYTES # BLD AUTO: 2.01 THOUSANDS/ΜL (ref 0.6–4.47)
LYMPHOCYTES NFR BLD AUTO: 29 % (ref 14–44)
MCH RBC QN AUTO: 30.1 PG (ref 26.8–34.3)
MCHC RBC AUTO-ENTMCNC: 32.4 G/DL (ref 31.4–37.4)
MCV RBC AUTO: 93 FL (ref 82–98)
MONOCYTES # BLD AUTO: 0.47 THOUSAND/ΜL (ref 0.17–1.22)
MONOCYTES NFR BLD AUTO: 7 % (ref 4–12)
NEUTROPHILS # BLD AUTO: 4.4 THOUSANDS/ΜL (ref 1.85–7.62)
NEUTS SEG NFR BLD AUTO: 62 % (ref 43–75)
NRBC BLD AUTO-RTO: 0 /100 WBCS
PLATELET # BLD AUTO: 84 THOUSANDS/UL (ref 149–390)
PMV BLD AUTO: 13.3 FL (ref 8.9–12.7)
RBC # BLD AUTO: 4.18 MILLION/UL (ref 3.81–5.12)
WBC # BLD AUTO: 6.99 THOUSAND/UL (ref 4.31–10.16)

## 2022-06-23 PROCEDURE — 90834 PSYTX W PT 45 MINUTES: CPT | Performed by: SOCIAL WORKER

## 2022-06-23 PROCEDURE — PNV: Performed by: ADVANCED PRACTICE MIDWIFE

## 2022-06-23 PROCEDURE — 85025 COMPLETE CBC W/AUTO DIFF WBC: CPT

## 2022-06-23 PROCEDURE — 36415 COLL VENOUS BLD VENIPUNCTURE: CPT

## 2022-06-23 NOTE — PSYCH
Psychotherapy Provided: Individual Psychotherapy 45 minutes     Length of time in session: 45 minutes, follow up in 2 week    Encounter Diagnosis     ICD-10-CM    1  Anxiety in pregnancy in third trimester, antepartum  O99 343     F41 9        Goals addressed in session: Goal 1     Pain:      none    0    Current suicide risk : Low         Behavioral Health Treatment Plan [facility]: Diagnosis and Treatment Plan explained to Larisa Medrano relates understanding diagnosis and is agreeable to Treatment Plan  Yes Assessment/Plan:      Diagnoses and all orders for this visit:    Anxiety in pregnancy in third trimester, antepartum          Subjective:  Pt presented for follow up therapy  Met with pt from 1921 - 5019  Pt reported that she is feeling a bit more anxious today  She didn't sleep well last night  She's also concerned about the house and the bedroom not getting done before she has the baby  She did come up with an alternative plan should this happen  Pt did speak to her boyfriend about her concerns  The pregnancy is going well and she is now in week 45  She is concerned about bonding with the baby  Processed pt's feelings and related this back to her poor relationship with her own parents  Pt is coping with her anxiety by distracting herself  Patient ID: Delmy Canales is a 29 y o  female  HPI    Review of Systems  : Pt was pleasant, cooperative and engaged      Objective:     Physical Exam

## 2022-06-23 NOTE — PROGRESS NOTES
Routine Prenatal Visit  [facility] [address] [place] [address]    Assessment/Plan:  Osiris Henao is a 29y o  year old  at 38w1d who presents for routine prenatal visit  1  Third trimester pregnancy  -     CBC and differential; Future    2  38 weeks gestation of pregnancy  Assessment & Plan:  - Received message for  Breast pump  - PCP for infant care  - Reviewed s/s labor, FKC  - awaiting spontaneous labor  - Condoms for birth control  - Taking PNV, MG,       3  Anxiety in pregnancy in third trimester, antepartum    4  Thrombocytopenia complicating pregnancy ([facility])        Subjective:     CC: Prenatal care    Derick Robles is a 29 y o  [place] female who presents for routine prenatal care at 38w1d  Pregnancy ROS: no leakage of fluid, pelvic pain, or vaginal bleeding   good fetal movement  Notes swelling and tingling of hands bilaterally  Discussed relief measures  B/p 106/64  Neg- s/s PIH  Active working long shifts  Notes some mild contractions, past few days        The following portions of the patient's history were reviewed and updated as appropriate: allergies, current medications, past family history, past medical history, obstetric history, gynecologic history, past social history, past surgical history and problem list       Objective:  /64   Wt 77 5 kg (170 lb 12 8 oz)   LMP 2021   BMI 31 24 kg/m²   Pregravid Weight/BMI: 59 kg (130 lb) (BMI 23 77)  Current Weight: 77 5 kg (170 lb 12 8 oz)   Total Weight Gain: 18 5 kg (40 lb 12 8 oz)   Pre-Shen Vitals    Flowsheet Row Most Recent Value   Prenatal Assessment    Fetal Heart Rate 146   Fundal Height (cm) 37 cm   Movement Present   Prenatal Vitals    Blood Pressure 106/64   Weight - Scale 77 5 kg (170 lb 12 8 oz)   Urine Albumin/Glucose    Dilation/Effacement/Station    Vaginal Drainage    Edema    LLE Edema Trace   RLE Edema Trace           General: Well appearing, no distress  Respiratory: Unlabored breathing  Cardiovascular: Regular rate  Abdomen: Soft, gravid, nontender  Fundal Height: Appropriate for gestational age  Extremities: Warm and well perfused  Non tender

## 2022-06-24 ENCOUNTER — APPOINTMENT (OUTPATIENT)
Dept: LAB | Facility: CLINIC | Age: 32
End: 2022-06-24
Payer: COMMERCIAL

## 2022-06-24 DIAGNOSIS — O99.119 THROMBOCYTOPENIA AFFECTING PREGNANCY (HCC): ICD-10-CM

## 2022-06-24 DIAGNOSIS — D69.6 THROMBOCYTOPENIA AFFECTING PREGNANCY (HCC): Primary | ICD-10-CM

## 2022-06-24 DIAGNOSIS — O99.119 THROMBOCYTOPENIA AFFECTING PREGNANCY (HCC): Primary | ICD-10-CM

## 2022-06-24 DIAGNOSIS — D69.6 THROMBOCYTOPENIA AFFECTING PREGNANCY (HCC): ICD-10-CM

## 2022-06-24 LAB
ALBUMIN SERPL BCP-MCNC: 2.6 G/DL (ref 3.5–5)
ALP SERPL-CCNC: 205 U/L (ref 46–116)
ALT SERPL W P-5'-P-CCNC: 23 U/L (ref 12–78)
ANION GAP SERPL CALCULATED.3IONS-SCNC: 9 MMOL/L (ref 4–13)
AST SERPL W P-5'-P-CCNC: 23 U/L (ref 5–45)
BILIRUB DIRECT SERPL-MCNC: 0.09 MG/DL (ref 0–0.2)
BILIRUB SERPL-MCNC: 0.45 MG/DL (ref 0.2–1)
BUN SERPL-MCNC: 5 MG/DL (ref 5–25)
CALCIUM ALBUM COR SERPL-MCNC: 10.3 MG/DL (ref 8.3–10.1)
CALCIUM SERPL-MCNC: 9.2 MG/DL (ref 8.3–10.1)
CHLORIDE SERPL-SCNC: 108 MMOL/L (ref 100–108)
CO2 SERPL-SCNC: 20 MMOL/L (ref 21–32)
CREAT SERPL-MCNC: 0.66 MG/DL (ref 0.6–1.3)
CREAT UR-MCNC: 65.7 MG/DL
GFR SERPL CREATININE-BSD FRML MDRD: 118 ML/MIN/1.73SQ M
GLUCOSE SERPL-MCNC: 90 MG/DL (ref 65–140)
POTASSIUM SERPL-SCNC: 3.9 MMOL/L (ref 3.5–5.3)
PROT SERPL-MCNC: 6.5 G/DL (ref 6.4–8.2)
PROT UR-MCNC: 8 MG/DL
PROT/CREAT UR: 0.12 MG/G{CREAT} (ref 0–0.1)
SODIUM SERPL-SCNC: 137 MMOL/L (ref 136–145)

## 2022-06-24 PROCEDURE — 80053 COMPREHEN METABOLIC PANEL: CPT

## 2022-06-24 PROCEDURE — 84156 ASSAY OF PROTEIN URINE: CPT

## 2022-06-24 PROCEDURE — 36415 COLL VENOUS BLD VENIPUNCTURE: CPT

## 2022-06-24 PROCEDURE — 85730 THROMBOPLASTIN TIME PARTIAL: CPT

## 2022-06-24 PROCEDURE — 85384 FIBRINOGEN ACTIVITY: CPT

## 2022-06-24 PROCEDURE — 82248 BILIRUBIN DIRECT: CPT

## 2022-06-24 PROCEDURE — 82570 ASSAY OF URINE CREATININE: CPT

## 2022-06-24 PROCEDURE — 85610 PROTHROMBIN TIME: CPT

## 2022-06-27 LAB
APTT PPP: 29 SECONDS (ref 23–37)
FIBRINOGEN PPP-MCNC: 419 MG/DL (ref 227–495)
INR PPP: 1 (ref 0.84–1.19)
PROTHROMBIN TIME: 12.8 SECONDS (ref 11.6–14.5)

## 2022-06-28 ENCOUNTER — HOSPITAL ENCOUNTER (INPATIENT)
Facility: HOSPITAL | Age: 32
LOS: 4 days | Discharge: HOME | End: 2022-07-02
Attending: OBSTETRICS & GYNECOLOGY | Admitting: OBSTETRICS & GYNECOLOGY
Payer: COMMERCIAL

## 2022-06-28 ENCOUNTER — HOSPITAL ENCOUNTER (OUTPATIENT)
Dept: LABOR AND DELIVERY | Facility: HOSPITAL | Age: 32
Discharge: HOME | End: 2022-06-28
Payer: COMMERCIAL

## 2022-06-28 DIAGNOSIS — Z98.891 STATUS POST PRIMARY LOW TRANSVERSE CESAREAN SECTION: Primary | ICD-10-CM

## 2022-06-28 DIAGNOSIS — Z3A.38 38 WEEKS GESTATION OF PREGNANCY: ICD-10-CM

## 2022-06-28 LAB
ABO GROUP BLD: NORMAL
BASOPHILS # BLD AUTO: 0.03 THOUSANDS/ΜL (ref 0–0.1)
BASOPHILS NFR BLD AUTO: 0 % (ref 0–1)
BLD GP AB SCN SERPL QL: NEGATIVE
EOSINOPHIL # BLD AUTO: 0.03 THOUSAND/ΜL (ref 0–0.61)
EOSINOPHIL NFR BLD AUTO: 0 % (ref 0–6)
ERYTHROCYTE [DISTWIDTH] IN BLOOD BY AUTOMATED COUNT: 14.6 % (ref 11.6–15.1)
HCT VFR BLD AUTO: 38.1 % (ref 34.8–46.1)
HGB BLD-MCNC: 12.8 G/DL (ref 11.5–15.4)
IMM GRANULOCYTES # BLD AUTO: 0.04 THOUSAND/UL (ref 0–0.2)
IMM GRANULOCYTES NFR BLD AUTO: 1 % (ref 0–2)
LYMPHOCYTES # BLD AUTO: 1.46 THOUSANDS/ΜL (ref 0.6–4.47)
LYMPHOCYTES NFR BLD AUTO: 17 % (ref 14–44)
MCH RBC QN AUTO: 30 PG (ref 26.8–34.3)
MCHC RBC AUTO-ENTMCNC: 33.6 G/DL (ref 31.4–37.4)
MCV RBC AUTO: 89 FL (ref 82–98)
MONOCYTES # BLD AUTO: 0.28 THOUSAND/ΜL (ref 0.17–1.22)
MONOCYTES NFR BLD AUTO: 3 % (ref 4–12)
NEUTROPHILS # BLD AUTO: 6.86 THOUSANDS/ΜL (ref 1.85–7.62)
NEUTS SEG NFR BLD AUTO: 79 % (ref 43–75)
NRBC BLD AUTO-RTO: 0 /100 WBCS
PLATELET # BLD AUTO: 97 THOUSANDS/UL (ref 149–390)
PMV BLD AUTO: 13.3 FL (ref 8.9–12.7)
RBC # BLD AUTO: 4.27 MILLION/UL (ref 3.81–5.12)
RH BLD: POSITIVE
SPECIMEN EXPIRATION DATE: NORMAL
WBC # BLD AUTO: 8.7 THOUSAND/UL (ref 4.31–10.16)

## 2022-06-28 PROCEDURE — 86592 SYPHILIS TEST NON-TREP QUAL: CPT | Performed by: OBSTETRICS & GYNECOLOGY

## 2022-06-28 PROCEDURE — 85025 COMPLETE CBC W/AUTO DIFF WBC: CPT | Performed by: OBSTETRICS & GYNECOLOGY

## 2022-06-28 PROCEDURE — 86850 RBC ANTIBODY SCREEN: CPT | Performed by: OBSTETRICS & GYNECOLOGY

## 2022-06-28 PROCEDURE — 86900 BLOOD TYPING SEROLOGIC ABO: CPT | Performed by: OBSTETRICS & GYNECOLOGY

## 2022-06-28 PROCEDURE — 4A1HXCZ MONITORING OF PRODUCTS OF CONCEPTION, CARDIAC RATE, EXTERNAL APPROACH: ICD-10-PCS | Performed by: OBSTETRICS & GYNECOLOGY

## 2022-06-28 PROCEDURE — 86901 BLOOD TYPING SEROLOGIC RH(D): CPT | Performed by: OBSTETRICS & GYNECOLOGY

## 2022-06-28 PROCEDURE — NC001 PR NO CHARGE: Performed by: OBSTETRICS & GYNECOLOGY

## 2022-06-28 RX ORDER — ACETAMINOPHEN 325 MG/1
650 TABLET ORAL EVERY 6 HOURS PRN
Status: DISCONTINUED | OUTPATIENT
Start: 2022-06-28 | End: 2022-06-30

## 2022-06-28 RX ORDER — CALCIUM CARBONATE 200(500)MG
1000 TABLET,CHEWABLE ORAL 3 TIMES DAILY PRN
Status: DISCONTINUED | OUTPATIENT
Start: 2022-06-28 | End: 2022-06-30

## 2022-06-28 RX ORDER — SODIUM CHLORIDE, SODIUM LACTATE, POTASSIUM CHLORIDE, CALCIUM CHLORIDE 600; 310; 30; 20 MG/100ML; MG/100ML; MG/100ML; MG/100ML
125 INJECTION, SOLUTION INTRAVENOUS CONTINUOUS
Status: DISCONTINUED | OUTPATIENT
Start: 2022-06-28 | End: 2022-06-30

## 2022-06-28 RX ORDER — ONDANSETRON 2 MG/ML
4 INJECTION INTRAMUSCULAR; INTRAVENOUS EVERY 6 HOURS PRN
Status: DISCONTINUED | OUTPATIENT
Start: 2022-06-28 | End: 2022-06-30

## 2022-06-29 ENCOUNTER — ANESTHESIA EVENT (INPATIENT)
Dept: LABOR AND DELIVERY | Facility: HOSPITAL | Age: 32
End: 2022-06-29
Payer: COMMERCIAL

## 2022-06-29 ENCOUNTER — ANESTHESIA (INPATIENT)
Dept: LABOR AND DELIVERY | Facility: HOSPITAL | Age: 32
End: 2022-06-29
Payer: COMMERCIAL

## 2022-06-29 LAB — RPR SER QL: NORMAL

## 2022-06-29 PROCEDURE — 4A1H7CZ MONITORING OF PRODUCTS OF CONCEPTION, CARDIAC RATE, VIA NATURAL OR ARTIFICIAL OPENING: ICD-10-PCS | Performed by: OBSTETRICS & GYNECOLOGY

## 2022-06-29 PROCEDURE — 10H073Z INSERTION OF MONITORING ELECTRODE INTO PRODUCTS OF CONCEPTION, VIA NATURAL OR ARTIFICIAL OPENING: ICD-10-PCS | Performed by: OBSTETRICS & GYNECOLOGY

## 2022-06-29 PROCEDURE — 3E0E7GC INTRODUCTION OF OTHER THERAPEUTIC SUBSTANCE INTO PRODUCTS OF CONCEPTION, VIA NATURAL OR ARTIFICIAL OPENING: ICD-10-PCS | Performed by: OBSTETRICS & GYNECOLOGY

## 2022-06-29 PROCEDURE — 10H07YZ INSERTION OF OTHER DEVICE INTO PRODUCTS OF CONCEPTION, VIA NATURAL OR ARTIFICIAL OPENING: ICD-10-PCS | Performed by: OBSTETRICS & GYNECOLOGY

## 2022-06-29 PROCEDURE — 3E0DXGC INTRODUCTION OF OTHER THERAPEUTIC SUBSTANCE INTO MOUTH AND PHARYNX, EXTERNAL APPROACH: ICD-10-PCS | Performed by: OBSTETRICS & GYNECOLOGY

## 2022-06-29 RX ORDER — ROPIVACAINE HYDROCHLORIDE 2 MG/ML
INJECTION, SOLUTION EPIDURAL; INFILTRATION; PERINEURAL CONTINUOUS PRN
Status: DISCONTINUED | OUTPATIENT
Start: 2022-06-29 | End: 2022-06-30 | Stop reason: HOSPADM

## 2022-06-29 RX ORDER — LIDOCAINE HYDROCHLORIDE 10 MG/ML
INJECTION, SOLUTION EPIDURAL; INFILTRATION; INTRACAUDAL; PERINEURAL
Status: COMPLETED | OUTPATIENT
Start: 2022-06-29 | End: 2022-06-29

## 2022-06-29 RX ORDER — TERBUTALINE SULFATE 1 MG/ML
INJECTION, SOLUTION SUBCUTANEOUS
Status: COMPLETED
Start: 2022-06-29 | End: 2022-06-29

## 2022-06-29 RX ORDER — OXYTOCIN/RINGER'S LACTATE 30/500 ML
1-30 PLASTIC BAG, INJECTION (ML) INTRAVENOUS
Status: DISCONTINUED | OUTPATIENT
Start: 2022-06-29 | End: 2022-06-30

## 2022-06-29 RX ORDER — SODIUM CHLORIDE, SODIUM LACTATE, POTASSIUM CHLORIDE, CALCIUM CHLORIDE 600; 310; 30; 20 MG/100ML; MG/100ML; MG/100ML; MG/100ML
100 INJECTION, SOLUTION INTRAVENOUS CONTINUOUS
Status: DISCONTINUED | OUTPATIENT
Start: 2022-06-29 | End: 2022-06-30

## 2022-06-29 RX ORDER — ROPIVACAINE HYDROCHLORIDE 5 MG/ML
INJECTION, SOLUTION EPIDURAL; INFILTRATION; PERINEURAL AS NEEDED
Status: DISCONTINUED | OUTPATIENT
Start: 2022-06-29 | End: 2022-06-30 | Stop reason: HOSPADM

## 2022-06-29 RX ORDER — ROPIVACAINE HYDROCHLORIDE 2 MG/ML
INJECTION, SOLUTION EPIDURAL; INFILTRATION; PERINEURAL AS NEEDED
Status: DISCONTINUED | OUTPATIENT
Start: 2022-06-29 | End: 2022-06-30 | Stop reason: HOSPADM

## 2022-06-29 RX ORDER — CEFAZOLIN SODIUM 1 G/50ML
1000 SOLUTION INTRAVENOUS ONCE
Status: COMPLETED | OUTPATIENT
Start: 2022-06-29 | End: 2022-06-30

## 2022-06-29 RX ADMIN — SODIUM CHLORIDE, SODIUM LACTATE, POTASSIUM CHLORIDE, AND CALCIUM CHLORIDE 125 ML/HR: .6; .31; .03; .02 INJECTION, SOLUTION INTRAVENOUS at 12:30

## 2022-06-29 RX ADMIN — Medication 50 MCG: at 00:14

## 2022-06-29 RX ADMIN — Medication 25 MCG: at 03:16

## 2022-06-29 RX ADMIN — Medication 2 MILLI-UNITS/MIN: at 15:14

## 2022-06-29 RX ADMIN — SODIUM CHLORIDE, SODIUM LACTATE, POTASSIUM CHLORIDE, AND CALCIUM CHLORIDE 125 ML/HR: .6; .31; .03; .02 INJECTION, SOLUTION INTRAVENOUS at 08:39

## 2022-06-29 RX ADMIN — ROPIVACAINE HYDROCHLORIDE 10 ML/HR: 2 INJECTION, SOLUTION EPIDURAL; INFILTRATION at 10:25

## 2022-06-29 RX ADMIN — SODIUM CHLORIDE, SODIUM LACTATE, POTASSIUM CHLORIDE, AND CALCIUM CHLORIDE 999 ML/HR: .6; .31; .03; .02 INJECTION, SOLUTION INTRAVENOUS at 11:14

## 2022-06-29 RX ADMIN — Medication 50 MCG: at 06:23

## 2022-06-29 RX ADMIN — ROPIVACAINE HYDROCHLORIDE 3 ML: 2 INJECTION, SOLUTION EPIDURAL; INFILTRATION; PERINEURAL at 11:19

## 2022-06-29 RX ADMIN — TERBUTALINE SULFATE 1 MG: 1 INJECTION, SOLUTION SUBCUTANEOUS at 11:13

## 2022-06-29 RX ADMIN — SODIUM CHLORIDE, SODIUM LACTATE, POTASSIUM CHLORIDE, AND CALCIUM CHLORIDE 999 ML/HR: .6; .31; .03; .02 INJECTION, SOLUTION INTRAVENOUS at 19:34

## 2022-06-29 RX ADMIN — ROPIVACAINE HYDROCHLORIDE 100 ML: 2 INJECTION, SOLUTION EPIDURAL; INFILTRATION at 18:01

## 2022-06-29 RX ADMIN — LIDOCAINE HYDROCHLORIDE 5 ML: 10 INJECTION, SOLUTION EPIDURAL; INFILTRATION; INTRACAUDAL; PERINEURAL at 10:21

## 2022-06-29 RX ADMIN — SODIUM CHLORIDE, SODIUM LACTATE, POTASSIUM CHLORIDE, AND CALCIUM CHLORIDE 999 ML/HR: .6; .31; .03; .02 INJECTION, SOLUTION INTRAVENOUS at 23:06

## 2022-06-29 RX ADMIN — ROPIVACAINE HYDROCHLORIDE 6 ML: 5 INJECTION, SOLUTION EPIDURAL; INFILTRATION; PERINEURAL at 11:19

## 2022-06-29 RX ADMIN — ROPIVACAINE HYDROCHLORIDE: 2 INJECTION, SOLUTION EPIDURAL; INFILTRATION at 11:15

## 2022-06-29 NOTE — OB LABOR/OXYTOCIN SAFETY PROGRESS
Labor Progress Note - Bismark Forte 29 y o  female MRN: [id number]    Unit/Bed#: L&D 322-01 Encounter: [id number]       Contraction Frequency (minutes): uterine irritability q1 5-5, pt reports q10  Contraction Quality: Mild  Tachysystole: No     Cervical Dilation: Fingertip  Cervical Effacement: 0  Fetal Station: -3     Baseline Rate: 130 bpm  Fetal Heart Rate: 130 BPM  FHR Category: Category I    Pt resting comfortably  SVE with slight change as above, FB and vaginal cytotec placed  FHT reactive, toco with irritability, ctx q1 5-5min; pt reports mild ctx q10min  PROCEDURE:  HINTON BALLOON PLACEMENT    A 24F hinton with a 30cc balloon was selected, SVE was performed and cervix was located, hinton was introduced over sterile gloved hands  Balloon advanced through cervix beyond the internal cervical os  A small amount amount of sterile saline solution was instilled in the balloon to confirm placement  Placement was confirmed to be beyond the internal cervical os  A total of 60cc of sterile saline solution was placed into the balloon  Pt tolerated well      Vital Signs:   Vitals:    06/29/22 0117   BP: 116/58   Pulse: 79   Resp:    Temp:      Dr Arnold Andrew aware    Noah Post MD 6/29/2022 3:23 AM

## 2022-06-29 NOTE — ANESTHESIA PROCEDURE NOTES
Epidural Block    Patient location during procedure: OB  Start time: 6/29/2022 10:20 AM  Reason for block: procedure for pain and at surgeon's request  Staffing  Performed: Anesthesiologist   Anesthesiologist: Nichelle Whitten DO  Preanesthetic Checklist  Completed: patient identified, IV checked, site marked, risks and benefits discussed, surgical consent, monitors and equipment checked, pre-op evaluation and timeout performed  Epidural  Patient position: sitting  Prep: Betadine  Patient monitoring: cardiac monitor and frequent blood pressure checks  Approach: midline  Location: lumbar  Injection technique: YOVANY air and YOVANY saline  Needle  Needle type: Tuohy   Needle gauge: 18 G  Catheter type: side hole  Catheter size: 20 G  Catheter securement method: surgical tape  Test dose: negativelidocaine (PF) (XYLOCAINE-MPF) 1 % - Infiltration   5 mL - 6/29/2022 10:21:00 AM  Assessment  Sensory level: T10  Number of attempts: 1negative aspiration for CSF, negative aspiration for heme and no paresthesia on injection  patient tolerated the procedure well with no immediate complications

## 2022-06-29 NOTE — OB LABOR/OXYTOCIN SAFETY PROGRESS
Labor Progress Note - Becca Olea 29 y o  female MRN: [id number]    Unit/Bed#: L&D 322-01 Encounter: [id number]       Contraction Frequency (minutes): 1-4  Contraction Quality: Mild  Tachysystole: No   Cervical Dilation: 4        Cervical Effacement: 70  Fetal Station: -3  Baseline Rate: 155 bpm  Fetal Heart Rate: 130 BPM  FHR Category: Category I               Vital Signs:   Vitals:    06/29/22 0436   BP: 116/76   Pulse: 67   Resp:    Temp:        Notes/comments:   Wade fell out went to evlauate patient as she was having some variable decelerations and becoming more uncomfortable   On exam she is 4/70/-3   Overall fetal monitoring with great variability , accelerations and occasional variable   Plan is for epidural for pain management and continue observation with initiation of pitocin         Jermaine Orr MD 6/29/2022 9:40 AM

## 2022-06-29 NOTE — H&P
H & P- Obstetrics   Nam Mayberry 29 y o  female MRN: [id number]  Unit/Bed#: L&D 322-01 Encounter: [id number]      Assessment: 29 y o   at 38w6d admitted for IOL; gestational thrombocytopenia  SVE: 0/0/-3    FHT:  Baseline Rate: 135 bpm  Variability: Moderate 6-25 bpm  Accelerations: 15 x 15 or greater  Decelerations: None  FHR Category: Category I Contraction Frequency (minutes): irregular, q2-3; pt does not feel  Contraction Duration (seconds): 45  Contraction Quality: Mild  Clinical EFW: 85%, HC >95%, AC 95% ; Vertex confirmed by U/S  GBS status: Negative   Postpartum contraception plan: Will continue to discuss      Plan:   · Admit  · CBC, RPR, Type & Screen  · Analgesia at maternal request  · Plan for PO cytotec to start at midnight when patient is 39w0d, plan for FB when able  · Antibiotics not indicated    Discussed with Dr Demian Rothman  SUBJECTIVE:    Chief Complaint: Oni Pressman    HPI: Nam Mayberry is a 29 y o  [place] Bre[place] with an WAYNE of 2022, by Last Menstrual Period at 38w6d who is being admitted for IOL in the setting of gestational thrombocytopenia  She reports irregular [place] ctx, none currently, has no LOF, and reports no VB  She states she has felt good FM  Pregnancy complications:   Patient Active Problem List   Diagnosis    38 weeks gestation of pregnancy    Thrombocytopenia complicating pregnancy ([address] )    Abnormal glucose tolerance test (GTT) during pregnancy, antepartum    Anxiety in pregnancy in third trimester, antepartum    Excessive weight gain affecting pregnancy       Baby complications/comments: none    Review of Systems   Constitutional: Negative for chills and fever  HENT: Negative for sore throat  Eyes: Negative for visual disturbance  Respiratory: Negative for cough and shortness of breath  Cardiovascular: Positive for leg swelling (Reports increase in leg swelling in recent weeks)  Negative for chest pain and palpitations     Gastrointestinal: Negative for abdominal pain, blood in stool, constipation (Hard stool, last BM today), diarrhea, nausea and vomiting  Genitourinary: Negative for dysuria and hematuria  Musculoskeletal: Negative for arthralgias and myalgias  Skin: Negative for rash  Neurological: Negative for dizziness, syncope and headaches  OB History    Para Term  AB Living   2 0     1     SAB IAB Ectopic Multiple Live Births   1              # Outcome Date GA Lbr Cash/2nd Weight Sex Delivery Anes PTL Lv   2 Current            1 SAB 2021 5w0d    SAB         Birth Comments: biochemical preg       Past Medical History:   Diagnosis Date    Known health problems: none     Varicella     chicken pox       Past Surgical History:   Procedure Laterality Date    WISDOM TOOTH EXTRACTION         Social History     Tobacco Use    Smoking status: Never Smoker    Smokeless tobacco: Never Used   Substance Use Topics    Alcohol use: Not Currently       Allergies   Allergen Reactions    Latex Hives       Medications Prior to Admission   Medication    cetirizine (ZyrTEC) 10 mg tablet    fluticasone (FLONASE) 50 mcg/act nasal spray    Magnesium 250 MG TABS    Prenatal Vit-Fe Fumarate-FA (PRENATAL VITAMINS PO)       OBJECTIVE:  Vitals:  HR:  [96] 96  Resp:  [16] 16  BP: (124)/(84) 124/84  There is no height or weight on file to calculate BMI  Physical Exam:  Physical Exam  Constitutional:       Appearance: Normal appearance  Cardiovascular:      Rate and Rhythm: Normal rate and regular rhythm  Heart sounds: Normal heart sounds  No murmur heard  No friction rub  No gallop  Pulmonary:      Effort: Pulmonary effort is normal  No respiratory distress  Breath sounds: Normal breath sounds  No stridor  No wheezing, rhonchi or rales  Abdominal:      General: There is no distension  Palpations: Abdomen is soft  Tenderness: There is no abdominal tenderness  There is no guarding or rebound        Comments: gravid   Musculoskeletal:         General: No swelling or tenderness  Neurological:      Mental Status: She is alert  Skin:     General: Skin is warm  Coloration: Skin is not pale  Findings: No erythema           SVE:   0/0/-3    FHT:  Baseline Rate: 135 bpm  Variability: Moderate 6-25 bpm  Accelerations: 15 x 15 or greater  Decelerations: None  FHR Category: Category I    TOCO:   Contraction Frequency (minutes): irregular, q2-3; pt does not feel  Contraction Duration (seconds): 45  Contraction Quality: Mild    Lab Results   Component Value Date    WBC 6 99 06/23/2022    HGB 12 6 06/23/2022    HCT 38 9 06/23/2022    PLT 84 (L) 06/23/2022     Lab Results   Component Value Date    K 3 9 06/24/2022     06/24/2022    CO2 20 (L) 06/24/2022    BUN 5 06/24/2022    CREATININE 0 66 06/24/2022    AST 23 06/24/2022    ALT 23 06/24/2022       Prenatal Labs: Reviewed     Prenatal Labs:   Blood Type:   Lab Results   Component Value Date/Time    ABO Grouping A 12/21/2021 10:45 AM     D (Rh type):   Lab Results   Component Value Date/Time    Rh Factor Positive 12/21/2021 10:45 AM     HCT/HGB:   Lab Results   Component Value Date/Time    Hematocrit 38 9 06/23/2022 08:45 AM    Hemoglobin 12 6 06/23/2022 08:45 AM     Platelets:   Lab Results   Component Value Date/Time    Platelets 84 (L) 79/93/4091 08:45 AM     1 hour Glucola:   Lab Results   Component Value Date/Time    Glucose 164 (H) 04/19/2022 08:56 AM     3 hour GTT: 81, 179, 136, 126  Lab Results   Component Value Date/Time    Glucose, GTT - 3 Hour 126 05/06/2022 03:10 PM     Rubella:   Lab Results   Component Value Date/Time    Rubella IgG Quant 137 5 12/21/2021 10:45 AM     VDRL/RPR:   Lab Results   Component Value Date/Time    RPR Non-Reactive 12/21/2021 10:45 AM     Urine Culture/Screen:   Lab Results   Component Value Date/Time    Urine Culture No Growth <1000 cfu/mL 12/21/2021 10:45 AM      Hep B:   Lab Results   Component Value Date/Time    Hepatitis B Surface Ag Non-reactive 12/21/2021 10:45 AM     HIV:   Lab Results   Component Value Date/Time    HIV-1/HIV-2 Ab Non-Reactive 12/21/2021 10:45 AM     Gonorrhea:   Lab Results   Component Value Date/Time    N gonorrhoeae, DNA Probe Negative 01/03/2022 08:38 AM     Group B Strep:    Lab Results   Component Value Date/Time    Strep Grp B PCR Negative 06/02/2022 08:33 AM          >2 Midnights  Tex Philippe MD  OB/GYN PGY-2  6/28/2022  9:54 PM

## 2022-06-29 NOTE — OB LABOR/OXYTOCIN SAFETY PROGRESS
Oxytocin Safety Progress Check Note - Antione Menon 29 y o  female MRN: [id number]    Unit/Bed#: L&D 322-01 Encounter: [id number]    Dose (amaya-units/min) Oxytocin: 10 amaya-units/min  Contraction Frequency (minutes): 1 5-3 5  Contraction Quality: Moderate  Tachysystole: No   Cervical Dilation: 5        Cervical Effacement: 80  Fetal Station: -1  Baseline Rate: 145 bpm  Fetal Heart Rate: 130 BPM  FHR Category: Category I now cat 2                Vital Signs:   Vitals:    06/29/22 1900   BP: 117/73   Pulse: 73   Resp:    Temp:    SpO2:        Notes/comments:   SAFETY HUDDLE:  TRIGGER: Category 2 FHR tracing    PARTICIPANTS: Dr Jozef Toledo     REVIEW OF CURRENT PLAN OF CARE AND MANAGEMENT: The tracing is Category 2 with the presence of moderate variability or accelerations  There have been significant decelerations with 50% or more of the contractions, but the pattern has been present for less than one hour  For this reason, I recommend continued resuscitation and observation   - action taken - iv fluid bolus , pitocin turned to half dose of 5 amaya units / excellent variability present      TIMELINE FOR NEXT ASSESSMENT: 30 min      ALL PARTICIPANTS IN AGREEMENT WITH PLAN OF CARE: Yes    IS PERRT REQUIRED: No     Steve Doherty MD 6/29/2022 7:23 PM

## 2022-06-29 NOTE — OB LABOR/OXYTOCIN SAFETY PROGRESS
Oxytocin Safety Progress Check Note - Celeste Sandhu 29 y o  female MRN: [id number]    Unit/Bed#: L&D 322-01 Encounter: [id number]       Contraction Frequency (minutes): 1-4  Contraction Quality: Moderate  Tachysystole: No   Cervical Dilation: 5        Cervical Effacement: 70  Fetal Station: -3  Baseline Rate: 155 bpm  Fetal Heart Rate: 130 BPM  FHR Category: Category I               Vital Signs:   Vitals:    06/29/22 0436   BP: 116/76   Pulse: 67   Resp:    Temp:        Notes/comments:   SAFETY HUDDLE:  TRIGGER: Category 2 FHR tracing    PARTICIPANTS: Dr Milagro Cortez RN     REVIEW OF CURRENT PLAN OF CARE AND MANAGEMENT: The tracing is Category 2 with the presence of moderate variability and accelerations  Despite positional changes and IV fluids   Plan was made to AROM with clear fluid and place IUPC and FSE   After this was performed there was a 3 min deceleration  IUPC demonstrated frequent contractions therefore terbutaline was given with excellent recovery to cat 1 tracing      Plan is to continue current management and initiate pitocin if continued reassuring tracing     TIMELINE FOR NEXT ASSESSMENT: 30 min     ALL PARTICIPANTS IN AGREEMENT WITH PLAN OF CARE: Yes    IS PERRT REQUIRED: No     Tan Garrett MD 6/29/2022 11:47 AM

## 2022-06-29 NOTE — ANESTHESIA PREPROCEDURE EVALUATION
Procedure:  LABOR ANALGESIA    Relevant Problems   GYN   (+) 38 weeks gestation of pregnancy      HEMATOLOGY   (+) Thrombocytopenia complicating pregnancy ([facility] [address] )        Physical Exam    Airway    Mallampati score: II  TM Distance: >3 FB  Neck ROM: full     Dental       Cardiovascular  Rhythm: regular, Rate: normal, Cardiovascular exam normal    Pulmonary  Pulmonary exam normal Breath sounds clear to auscultation,     Other Findings        Anesthesia Plan  ASA Score- 3     Anesthesia Type- epidural with ASA Monitors  Additional Monitors:   Airway Plan:           Plan Factors-Exercise tolerance (METS): >4 METS  Chart reviewed  Existing labs reviewed  Patient is not a current smoker  Obstructive sleep apnea risk education given perioperatively  Induction-     Postoperative Plan-     Informed Consent- Anesthetic plan and risks discussed with patient

## 2022-06-29 NOTE — OB LABOR/OXYTOCIN SAFETY PROGRESS
Oxytocin Safety Progress Check Note - Ginette Saint 29 y o  female MRN: [id number]    Unit/Bed#: L&D 322-01 Encounter: [id number]    Dose (amaya-units/min) Oxytocin: 4 amaya-units/min  Contraction Frequency (minutes): 1-4  Contraction Quality: Moderate  Tachysystole: No   Cervical Dilation: 5  Cervical Effacement: 70  Fetal Station: -2  Baseline Rate: 145 bpm  Fetal Heart Rate: 130 BPM  FHR Category: Category I               Vital Signs:   Vitals:    06/29/22 1630   BP: 127/76   Pulse: 62   Resp:    Temp:    SpO2:        Notes/comments: resting comfortably  No interval cervical change noted  Fetal heart tracing cat I with ctx 1-4mins apart  Continue Pitocin titration as tolerated  Will discuss with Dr Jesenia Arshad MD 6/29/2022 4:57 PM

## 2022-06-30 PROBLEM — Z98.891 STATUS POST PRIMARY LOW TRANSVERSE CESAREAN SECTION: Status: ACTIVE | Noted: 2022-01-03

## 2022-06-30 LAB
BASE EXCESS BLDCOA CALC-SCNC: -5.6 MMOL/L (ref 3–11)
BASE EXCESS BLDCOV CALC-SCNC: -4.7 MMOL/L (ref 1–9)
HCO3 BLDCOA-SCNC: 22.2 MMOL/L (ref 17.3–27.3)
HCO3 BLDCOV-SCNC: 22.2 MMOL/L (ref 12.2–28.6)
O2 CT VFR BLDCOA CALC: 8.9 ML/DL
OXYHGB MFR BLDCOA: 39.9 %
OXYHGB MFR BLDCOV: 22 %
PCO2 BLDCOA: 51.7 MM[HG] (ref 30–60)
PCO2 BLDCOV: 47.8 MM HG (ref 27–43)
PH BLDCOA: 7.25 [PH] (ref 7.23–7.43)
PH BLDCOV: 7.29 [PH] (ref 7.19–7.49)
PO2 BLDCOA: 20.2 MM HG (ref 5–25)
PO2 BLDCOV: 14.2 MM HG (ref 15–45)
SAO2 % BLDCOV: 4.8 ML/DL

## 2022-06-30 PROCEDURE — 59510 CESAREAN DELIVERY: CPT | Performed by: OBSTETRICS & GYNECOLOGY

## 2022-06-30 PROCEDURE — 82805 BLOOD GASES W/O2 SATURATION: CPT | Performed by: OBSTETRICS & GYNECOLOGY

## 2022-06-30 RX ORDER — KETOROLAC TROMETHAMINE 30 MG/ML
INJECTION, SOLUTION INTRAMUSCULAR; INTRAVENOUS AS NEEDED
Status: DISCONTINUED | OUTPATIENT
Start: 2022-06-30 | End: 2022-06-30 | Stop reason: HOSPADM

## 2022-06-30 RX ORDER — DEXAMETHASONE SODIUM PHOSPHATE 4 MG/ML
8 INJECTION, SOLUTION INTRA-ARTICULAR; INTRALESIONAL; INTRAMUSCULAR; INTRAVENOUS; SOFT TISSUE ONCE AS NEEDED
Status: ACTIVE | OUTPATIENT
Start: 2022-06-30 | End: 2022-07-01

## 2022-06-30 RX ORDER — ONDANSETRON 2 MG/ML
4 INJECTION INTRAMUSCULAR; INTRAVENOUS ONCE AS NEEDED
Status: DISCONTINUED | OUTPATIENT
Start: 2022-06-30 | End: 2022-06-30

## 2022-06-30 RX ORDER — IBUPROFEN 600 MG/1
600 TABLET ORAL EVERY 6 HOURS PRN
Status: DISCONTINUED | OUTPATIENT
Start: 2022-07-01 | End: 2022-07-02 | Stop reason: HOSPADM

## 2022-06-30 RX ORDER — OXYTOCIN/RINGER'S LACTATE 30/500 ML
62.5 PLASTIC BAG, INJECTION (ML) INTRAVENOUS CONTINUOUS
Status: DISCONTINUED | OUTPATIENT
Start: 2022-06-30 | End: 2022-06-30 | Stop reason: SDUPTHER

## 2022-06-30 RX ORDER — DOCUSATE SODIUM 100 MG/1
100 CAPSULE, LIQUID FILLED ORAL 2 TIMES DAILY
Status: DISCONTINUED | OUTPATIENT
Start: 2022-06-30 | End: 2022-07-02 | Stop reason: HOSPADM

## 2022-06-30 RX ORDER — SIMETHICONE 80 MG
80 TABLET,CHEWABLE ORAL 4 TIMES DAILY PRN
Status: DISCONTINUED | OUTPATIENT
Start: 2022-06-30 | End: 2022-07-02 | Stop reason: HOSPADM

## 2022-06-30 RX ORDER — OXYTOCIN/RINGER'S LACTATE 30/500 ML
62.5 PLASTIC BAG, INJECTION (ML) INTRAVENOUS ONCE
Status: COMPLETED | OUTPATIENT
Start: 2022-06-30 | End: 2022-06-30

## 2022-06-30 RX ORDER — ACETAMINOPHEN 325 MG/1
650 TABLET ORAL EVERY 6 HOURS SCHEDULED
Status: DISCONTINUED | OUTPATIENT
Start: 2022-06-30 | End: 2022-07-02 | Stop reason: HOSPADM

## 2022-06-30 RX ORDER — TRANEXAMIC ACID 100 MG/ML
INJECTION, SOLUTION INTRAVENOUS
Status: COMPLETED
Start: 2022-06-30 | End: 2022-06-30

## 2022-06-30 RX ORDER — TRANEXAMIC ACID 100 MG/ML
INJECTION, SOLUTION INTRAVENOUS AS NEEDED
Status: DISCONTINUED | OUTPATIENT
Start: 2022-06-30 | End: 2022-06-30 | Stop reason: HOSPADM

## 2022-06-30 RX ORDER — DIPHENHYDRAMINE HCL 25 MG
25 TABLET ORAL EVERY 6 HOURS PRN
Status: DISCONTINUED | OUTPATIENT
Start: 2022-06-30 | End: 2022-07-02 | Stop reason: HOSPADM

## 2022-06-30 RX ORDER — MORPHINE SULFATE 0.5 MG/ML
INJECTION, SOLUTION EPIDURAL; INTRATHECAL; INTRAVENOUS
Status: COMPLETED
Start: 2022-06-30 | End: 2022-06-30

## 2022-06-30 RX ORDER — NALOXONE HYDROCHLORIDE 0.4 MG/ML
0.1 INJECTION, SOLUTION INTRAMUSCULAR; INTRAVENOUS; SUBCUTANEOUS
Status: ACTIVE | OUTPATIENT
Start: 2022-06-30 | End: 2022-07-01

## 2022-06-30 RX ORDER — CALCIUM CARBONATE 200(500)MG
1000 TABLET,CHEWABLE ORAL DAILY PRN
Status: DISCONTINUED | OUTPATIENT
Start: 2022-06-30 | End: 2022-07-02 | Stop reason: HOSPADM

## 2022-06-30 RX ORDER — OXYCODONE HYDROCHLORIDE 5 MG/1
10 TABLET ORAL EVERY 4 HOURS PRN
Status: DISCONTINUED | OUTPATIENT
Start: 2022-07-01 | End: 2022-07-02 | Stop reason: HOSPADM

## 2022-06-30 RX ORDER — MORPHINE SULFATE 0.5 MG/ML
INJECTION, SOLUTION EPIDURAL; INTRATHECAL; INTRAVENOUS AS NEEDED
Status: DISCONTINUED | OUTPATIENT
Start: 2022-06-30 | End: 2022-06-30 | Stop reason: HOSPADM

## 2022-06-30 RX ORDER — KETOROLAC TROMETHAMINE 30 MG/ML
15 INJECTION, SOLUTION INTRAMUSCULAR; INTRAVENOUS EVERY 6 HOURS PRN
Status: DISPENSED | OUTPATIENT
Start: 2022-06-30 | End: 2022-07-01

## 2022-06-30 RX ORDER — ONDANSETRON 2 MG/ML
4 INJECTION INTRAMUSCULAR; INTRAVENOUS EVERY 4 HOURS PRN
Status: ACTIVE | OUTPATIENT
Start: 2022-06-30 | End: 2022-07-01

## 2022-06-30 RX ORDER — KETOROLAC TROMETHAMINE 30 MG/ML
15 INJECTION, SOLUTION INTRAMUSCULAR; INTRAVENOUS EVERY 6 HOURS PRN
Status: DISCONTINUED | OUTPATIENT
Start: 2022-06-30 | End: 2022-06-30

## 2022-06-30 RX ORDER — ENOXAPARIN SODIUM 100 MG/ML
40 INJECTION SUBCUTANEOUS DAILY
Status: DISCONTINUED | OUTPATIENT
Start: 2022-07-01 | End: 2022-07-01

## 2022-06-30 RX ORDER — METHYLERGONOVINE MALEATE 0.2 MG/ML
INJECTION INTRAVENOUS AS NEEDED
Status: DISCONTINUED | OUTPATIENT
Start: 2022-06-30 | End: 2022-06-30 | Stop reason: HOSPADM

## 2022-06-30 RX ORDER — OXYCODONE HYDROCHLORIDE AND ACETAMINOPHEN 5; 325 MG/1; MG/1
1 TABLET ORAL EVERY 6 HOURS PRN
Status: DISCONTINUED | OUTPATIENT
Start: 2022-06-30 | End: 2022-07-02 | Stop reason: HOSPADM

## 2022-06-30 RX ORDER — ONDANSETRON 2 MG/ML
INJECTION INTRAMUSCULAR; INTRAVENOUS AS NEEDED
Status: DISCONTINUED | OUTPATIENT
Start: 2022-06-30 | End: 2022-06-30 | Stop reason: HOSPADM

## 2022-06-30 RX ORDER — OXYTOCIN/RINGER'S LACTATE 30/500 ML
PLASTIC BAG, INJECTION (ML) INTRAVENOUS AS NEEDED
Status: DISCONTINUED | OUTPATIENT
Start: 2022-06-30 | End: 2022-06-30 | Stop reason: HOSPADM

## 2022-06-30 RX ORDER — SODIUM CHLORIDE, SODIUM LACTATE, POTASSIUM CHLORIDE, CALCIUM CHLORIDE 600; 310; 30; 20 MG/100ML; MG/100ML; MG/100ML; MG/100ML
125 INJECTION, SOLUTION INTRAVENOUS CONTINUOUS
Status: DISCONTINUED | OUTPATIENT
Start: 2022-06-30 | End: 2022-07-02 | Stop reason: HOSPADM

## 2022-06-30 RX ORDER — DIAPER,BRIEF,INFANT-TODD,DISP
1 EACH MISCELLANEOUS DAILY PRN
Status: DISCONTINUED | OUTPATIENT
Start: 2022-06-30 | End: 2022-07-02 | Stop reason: HOSPADM

## 2022-06-30 RX ORDER — OXYCODONE HYDROCHLORIDE 5 MG/1
5 TABLET ORAL EVERY 4 HOURS PRN
Status: DISCONTINUED | OUTPATIENT
Start: 2022-07-01 | End: 2022-07-02 | Stop reason: HOSPADM

## 2022-06-30 RX ORDER — LIDOCAINE HYDROCHLORIDE AND EPINEPHRINE 20; 5 MG/ML; UG/ML
INJECTION, SOLUTION EPIDURAL; INFILTRATION; INTRACAUDAL; PERINEURAL AS NEEDED
Status: DISCONTINUED | OUTPATIENT
Start: 2022-06-30 | End: 2022-06-30 | Stop reason: HOSPADM

## 2022-06-30 RX ORDER — NALBUPHINE HCL 10 MG/ML
2 AMPUL (ML) INJECTION 4 TIMES DAILY PRN
Status: DISCONTINUED | OUTPATIENT
Start: 2022-06-30 | End: 2022-07-02 | Stop reason: HOSPADM

## 2022-06-30 RX ORDER — FENTANYL CITRATE/PF 50 MCG/ML
25 SYRINGE (ML) INJECTION
Status: DISCONTINUED | OUTPATIENT
Start: 2022-06-30 | End: 2022-06-30

## 2022-06-30 RX ORDER — ONDANSETRON 2 MG/ML
4 INJECTION INTRAMUSCULAR; INTRAVENOUS EVERY 8 HOURS PRN
Status: DISCONTINUED | OUTPATIENT
Start: 2022-06-30 | End: 2022-07-02 | Stop reason: HOSPADM

## 2022-06-30 RX ADMIN — LIDOCAINE HYDROCHLORIDE,EPINEPHRINE BITARTRATE 10 ML: 20; .005 INJECTION, SOLUTION EPIDURAL; INFILTRATION; INTRACAUDAL; PERINEURAL at 08:01

## 2022-06-30 RX ADMIN — TRANEXAMIC ACID 1000 G: 100 INJECTION, SOLUTION INTRAVENOUS at 08:19

## 2022-06-30 RX ADMIN — CEFAZOLIN SODIUM 1000 MG: 1 SOLUTION INTRAVENOUS at 08:00

## 2022-06-30 RX ADMIN — ONDANSETRON 4 MG: 2 INJECTION INTRAMUSCULAR; INTRAVENOUS at 08:35

## 2022-06-30 RX ADMIN — SODIUM CHLORIDE, SODIUM LACTATE, POTASSIUM CHLORIDE, AND CALCIUM CHLORIDE 125 ML/HR: .6; .31; .03; .02 INJECTION, SOLUTION INTRAVENOUS at 10:48

## 2022-06-30 RX ADMIN — DOCUSATE SODIUM 100 MG: 100 CAPSULE, LIQUID FILLED ORAL at 17:55

## 2022-06-30 RX ADMIN — SODIUM CHLORIDE, SODIUM LACTATE, POTASSIUM CHLORIDE, AND CALCIUM CHLORIDE 125 ML/HR: .6; .31; .03; .02 INJECTION, SOLUTION INTRAVENOUS at 10:00

## 2022-06-30 RX ADMIN — METHYLERGONOVINE MALEATE 0.2 MG: 0.2 INJECTION, SOLUTION INTRAMUSCULAR; INTRAVENOUS at 08:28

## 2022-06-30 RX ADMIN — MORPHINE SULFATE 0.5 MG: 0.5 INJECTION EPIDURAL; INTRATHECAL; INTRAVENOUS at 08:26

## 2022-06-30 RX ADMIN — KETOROLAC TROMETHAMINE 15 MG: 30 INJECTION, SOLUTION INTRAMUSCULAR; INTRAVENOUS at 08:51

## 2022-06-30 RX ADMIN — NALBUPHINE HYDROCHLORIDE 2 MG: 10 INJECTION, SOLUTION INTRAMUSCULAR; INTRAVENOUS; SUBCUTANEOUS at 12:37

## 2022-06-30 RX ADMIN — ROPIVACAINE HYDROCHLORIDE: 2 INJECTION, SOLUTION EPIDURAL; INFILTRATION at 03:55

## 2022-06-30 RX ADMIN — LIDOCAINE HYDROCHLORIDE,EPINEPHRINE BITARTRATE 10 ML: 20; .005 INJECTION, SOLUTION EPIDURAL; INFILTRATION; INTRACAUDAL; PERINEURAL at 07:43

## 2022-06-30 RX ADMIN — MORPHINE SULFATE 1 MG: 0.5 INJECTION EPIDURAL; INTRATHECAL; INTRAVENOUS at 08:23

## 2022-06-30 RX ADMIN — Medication 250 MILLI-UNITS/MIN: at 08:20

## 2022-06-30 RX ADMIN — DIPHENHYDRAMINE HCL 25 MG: 25 TABLET, COATED ORAL at 10:47

## 2022-06-30 RX ADMIN — ACETAMINOPHEN 650 MG: 325 TABLET, FILM COATED ORAL at 10:10

## 2022-06-30 RX ADMIN — NALBUPHINE HYDROCHLORIDE 2 MG: 10 INJECTION, SOLUTION INTRAMUSCULAR; INTRAVENOUS; SUBCUTANEOUS at 18:25

## 2022-06-30 RX ADMIN — SODIUM CHLORIDE, SODIUM LACTATE, POTASSIUM CHLORIDE, AND CALCIUM CHLORIDE 999 ML/HR: .6; .31; .03; .02 INJECTION, SOLUTION INTRAVENOUS at 07:21

## 2022-06-30 RX ADMIN — ACETAMINOPHEN 650 MG: 325 TABLET, FILM COATED ORAL at 17:55

## 2022-06-30 RX ADMIN — MORPHINE SULFATE 1 MG: 0.5 INJECTION EPIDURAL; INTRATHECAL; INTRAVENOUS at 08:21

## 2022-06-30 RX ADMIN — SODIUM CHLORIDE, SODIUM LACTATE, POTASSIUM CHLORIDE, AND CALCIUM CHLORIDE 100 ML/HR: .6; .31; .03; .02 INJECTION, SOLUTION INTRAVENOUS at 05:56

## 2022-06-30 RX ADMIN — AZITHROMYCIN MONOHYDRATE 500 MG: 500 INJECTION, POWDER, LYOPHILIZED, FOR SOLUTION INTRAVENOUS at 07:40

## 2022-06-30 RX ADMIN — Medication 62.5 MILLI-UNITS/MIN: at 10:01

## 2022-06-30 RX ADMIN — SODIUM CHLORIDE, SODIUM LACTATE, POTASSIUM CHLORIDE, AND CALCIUM CHLORIDE 125 ML/HR: .6; .31; .03; .02 INJECTION, SOLUTION INTRAVENOUS at 03:07

## 2022-06-30 RX ADMIN — KETOROLAC TROMETHAMINE 15 MG: 30 INJECTION, SOLUTION INTRAMUSCULAR; INTRAVENOUS at 18:30

## 2022-06-30 NOTE — PLAN OF CARE
Problem: Knowledge Deficit  Goal: Verbalizes understanding of labor plan  Description: Assess patient/family/caregiver's baseline knowledge level and ability to understand information  Provide education via patient/family/caregiver's preferred learning method at appropriate level of understanding  1  Provide teaching at level of understanding  2  Provide teaching via preferred learning method(s)  Outcome: Adequate for Discharge     Problem: Labor & Delivery  Goal: Manages discomfort  Description: Assess and monitor for signs and symptoms of discomfort  Assess patient's pain level regularly and per hospital policy  Administer medications as ordered  Support use of nonpharmacological methods to help control pain such as distraction, imagery, relaxation, and application of heat and cold  Collaborate with interdisciplinary team and patient to determine appropriate pain management plan  1  Include patient in decisions related to comfort  2  Offer non-pharmacological pain management interventions  3  Report ineffective pain management to physician  Outcome: Adequate for Discharge  Goal: Patient vital signs are stable  Description: 1  Assess vital signs - vaginal delivery    Outcome: Adequate for Discharge

## 2022-06-30 NOTE — OB LABOR/OXYTOCIN SAFETY PROGRESS
Oxytocin Safety Progress Check Note - Skipper Me 29 y o  female MRN: [id number]    Unit/Bed#: L&D 322-01 Encounter: [id number]    Dose (amaya-units/min) Oxytocin: 10 amaya-units/min  Contraction Frequency (minutes): 2-4 5  Contraction Quality: Moderate  Tachysystole: No     Cervical Dilation: 6  Cervical Effacement: 80  Fetal Station: -1     Baseline Rate: 135 bpm  Fetal Heart Rate: 135 BPM  FHR Category: Category II    Pt with late decels, resolved with repositioning  Variables also noted and now resolved  SVE unchanged  IUPC with seemingly low amplitudes at pit of 10; baseline re-set and amplitude higher  Pt is on 4th L of LR since admission  If late decelerations continue, give IV fluid bolus  Can consider decreasing pit if MVUs>200      Vital Signs:   Vitals:    06/30/22 0000   BP: 114/56   Pulse: 55   Resp:    Temp:    SpO2:      D/w Dr Rayna Beck MD 6/30/2022 1:42 AM

## 2022-06-30 NOTE — OB LABOR/OXYTOCIN SAFETY PROGRESS
Oxytocin Safety Progress Check Note - Omaira Chatman 29 y o  female MRN: [id number]    Unit/Bed#: L&D 322-01 Encounter: [id number]    Dose (amaya-units/min) Oxytocin: 10 amaya-units/min  Contraction Frequency (minutes): 2-3  Contraction Quality: Moderate  Tachysystole: No     Cervical Dilation: 6  Cervical Effacement: 80  Fetal Station: -1     Baseline Rate: 130 bpm  Fetal Heart Rate: 130 BPM  FHR Category: Category I    Pt comfortable with epidural  Change on SVE as above  FHT now category 1 with amnioinfusion running  Pit at 9, plan to increase to 10 now      Vital Signs:   Vitals:    06/30/22 0000   BP: 114/56   Pulse: 55   Resp:    Temp:    SpO2:      D/w Dr Josse Mckoy MD 6/30/2022 12:47 AM

## 2022-06-30 NOTE — OB LABOR/OXYTOCIN SAFETY PROGRESS
Oxytocin Safety Progress Check Note - Petty Tomlinson 29 y o  female MRN: [id number]    Unit/Bed#: L&D 322-01 Encounter: [id number]    Dose (amaya-units/min) Oxytocin: 14 amaya-units/min  Contraction Frequency (minutes): 1-3  Contraction Quality: Moderate  Tachysystole: No     Cervical Dilation: 6  Cervical Effacement: 80  Fetal Station: -1     Baseline Rate: 150 bpm  Fetal Heart Rate: 150 BPM  FHR Category: Category II    Pt reporting some pain in her back at epidural site, otherwise comfortable overall  Unchanged on SVE; nearly 6h of intermittently adequate ctx after AROM without cervical change    Discussed plan to proceed with primary low transverse  section for arrest of dilation/active phase arrest    Vital Signs:   Vitals:    22 0630   BP: 145/82   Pulse: 62   Resp:    Temp:    SpO2:      D/w Dr Alejandra Carlin MD 2022 6:45 AM

## 2022-06-30 NOTE — PLAN OF CARE
Problem: POSTPARTUM  Goal: Experiences normal postpartum course  Description: INTERVENTIONS:  - Monitor maternal vital signs  - Assess uterine involution and lochia  Outcome: Progressing  Goal: Appropriate maternal -  bonding  Description: INTERVENTIONS:  - Identify family support  - Assess for appropriate maternal/infant bonding   -Encourage maternal/infant bonding opportunities  - Referral to  or  as needed  Outcome: Progressing  Goal: Establishment of infant feeding pattern  Description: INTERVENTIONS:  - Assess breast/bottle feeding  - Refer to lactation as needed  Outcome: Progressing  Goal: Incision(s), wounds(s) or drain site(s) healing without S/S of infection  Description: INTERVENTIONS  - Assess and document dressing, incision, wound bed, drain sites and surrounding tissue  - Provide patient and family education  - Perform skin care  Outcome: Progressing

## 2022-06-30 NOTE — UTILIZATION REVIEW
Initial Clinical Review    Admission: Date/Time/Statement:   Admission Orders (From admission, onward)     Ordered        22  Inpatient Admission  Once                      Orders Placed This Encounter   Procedures    Inpatient Admission     Standing Status:   Standing     Number of Occurrences:   1     Order Specific Question:   Level of Care     Answer:   Med Surg [16]     Order Specific Question:   Estimated length of stay     Answer:   More than 2 Midnights     Order Specific Question:   Certification     Answer:   I certify that inpatient services are medically necessary for this patient for a duration of greater than two midnights  See H&P and MD Progress Notes for additional information about the patient's course of treatment  Chief Complaint   Patient presents with    Scheduled Induction       Initial Presentation: 29 y o  female , presented to  [facility], from home via walk in  Admitted as Inpatient due to  at [address] [place] to L&D for IOL  Date: 2022  29 y o  Cherlynn Klinefelter at 38w6d admitted for IOL  gestational thrombocytopenia  SVE: 0/0/-3    FHT:  Baseline Rate: 135 bpm  Variability: Moderate 6-25 bpm  Accelerations: 15 x 15 or greater  Decelerations: None  FHR Category: Category I Contraction Frequency (minutes): irregular, q2-3; pt does not feel  Contraction Duration (seconds): 45  Contraction Quality: Mild  Clinical EFW: 85%, HC >95%, AC 95% ; Vertex confirmed by U/S  GBS status: Negative   Postpartum contraception plan: Will continue to discuss   Plan:   CBC, RPR, Type & Screen  Analgesia at maternal request   Plan for PO cytotec to start at midnight when patient is 39w0d, plan for FB when able  Antibiotics not indicated  Day 2: 2022 @ 0323  Contraction Frequency (minutes): uterine irritability q1 5-5, pt reports q10  Contraction Quality: Mild  Tachysystole:  No    Cervical Dilation: Fingertip  Cervical Effacement: 0  Fetal Station: -3    Baseline Rate: 130 bpm  Fetal Heart Rate: 130 BPM  FHR Category: Category I      2022 @ 1675  Dose (amaya-units/min) Oxytocin: 4 amaya-units/min  Contraction Frequency (minutes): 1-4  Contraction Quality: Moderate  Tachysystole: No   Cervical Dilation: 5  Cervical Effacement: 70  Fetal Station: -2  Baseline Rate: 145 bpm  Fetal Heart Rate: 130 BPM  FHR Category: Category I  Resting comfortably  No interval cervical change noted  Fetal heart tracing cat I with ctx 1-4mins apart  Continue Pitocin titration as tolerated     Day 3:  2022  @ 0047  Dose (amaya-units/min) Oxytocin: 10 amaya-units/min  Contraction Frequency (minutes): 2-3  Contraction Quality: Moderate  Tachysystole: No    Cervical Dilation: 6  Cervical Effacement: 80  Fetal Station: -1    Baseline Rate: 130 bpm  Fetal Heart Rate: 130 BPM  FHR Category: Category I  Pt comfortable with epidural  Change on SVE as above  FHT now category 1 with amnioinfusion running  Pit at 9, plan to increase to 10 now  2022  @ 0758  Dose (amaya-units/min) Oxytocin: 0 amaya-units/min (Per Dr Shannon Haskins, for c/s prep)  Contraction Frequency (minutes): 1-3  Contraction Quality: Moderate  Tachysystole: No   Cervical Dilation: 6  Cervical Effacement: 80  Fetal Station: -1  Baseline Rate: 150 bpm  Fetal Heart Rate: 150 BPM  FHR Category: Category II  Yvonne Boyd is in the active phase of labor  The cervical exam has remained unchanged and membranes have been ruptured and Oxytocin has been running for approximately 16 hours  During this time, Oxytocin was halved for a cumulative duration of approximately 1 hour due to recurrent late decelerations  Currently, the contraction pattern shows contractions every 3 minutes  The cervical exam has remained unchanged at 6/80/-1 since approximately 7 hours ago       SURGERY DATE: 2022  Operative Indications:  Primary low transverse  section for arrest of dilation  Procedure(s) (LRB):  SECTION ()  Anesthesia:  Epidural with ASA Monitors  Operative Findings:  1  Viable male  at 39w1d with APGARs of 9 and 9 at 1 and 5 minutes  Fetus weighted 8lb 5oz  2  Clear amniotic fluid  3  Normal intact placenta with centrally inserted 3VC  4  Normal uterus, bilateral tubes and ovaries  5  Adhesions absent         Triage Vitals   Temperature Pulse Respirations Blood Pressure SpO2   22 0951   98 2 °F (36 8 °C) 96 16 124/84 90 %      Temp Source Heart Rate Source Patient Position - Orthostatic VS BP Location FiO2 (%)   22 --   Oral Monitor Lying Right arm       Pain Score       22       No Pain          Wt Readings from Last 1 Encounters:   22 77 5 kg (170 lb 12 8 oz)     Additional Vital Signs:     Date/Time Temp Pulse Resp BP SpO2 O2 Device Cardiac (WDL) Patient Position - Orthostatic VS   22 1545 -- -- -- -- 93 % -- -- --   22 1500 98 6 °F (37 °C) 79 18 107/62 89 % Abnormal  None (Room air)  WDL Lying   O2 Device: Simultaneous filing  User may not have seen previous data   at 22 1500   22 1335 99 °F (37 2 °C) 78 18 114/67 90 % -- -- Lying   22 1235 98 2 °F (36 8 °C) 69 18 109/62 90 % None (Room air) -- Sitting   22 1135 99 4 °F (37 4 °C) 78 18 114/65 91 % None (Room air) -- Sitting   22 1035 98 7 °F (37 1 °C) 76 16 121/68 92 % None (Room air) -- Sitting   22 0935 99 1 °F (37 3 °C) 68 16 128/67 94 % -- WDL Lying   22 0925 -- 69 16 104/72 94 % -- WDL Lying   22 0915 -- 76 16 108/67 96 % -- WDL Lying   22 0905 98 8 °F (37 1 °C) 70 16 131/66 94 % -- WDL Lying   2245 -- 78 -- 114/60 -- -- -- --   22 -- 67 -- 143/76 -- -- -- --   22 0715 -- 67 -- 129/71 -- -- -- --   22 0700 98 8 °F (37 1 °C) 67 -- 142/76 -- -- -- --   22 -- 68 -- 137/64 -- -- -- -- 06/30/22 0630 -- 62 -- 145/82 -- -- -- --   06/30/22 0615 -- 66 -- 133/63 -- -- -- --   06/30/22 0600 98 6 °F (37 °C) 57 -- 146/84 -- -- -- --   06/30/22 0545 -- 58 -- 141/77 -- -- -- --   06/30/22 0530 -- 57 -- 147/76 -- -- -- --   06/30/22 0515 -- 52 Abnormal  -- 129/71 -- -- -- --   06/30/22 0500 98 1 °F (36 7 °C) 64 -- 133/68 -- -- -- --   06/30/22 0445 -- 55 -- 130/66 -- -- -- --   06/30/22 0430 -- 55 -- 128/63 -- -- -- --   06/30/22 0415 -- 54 Abnormal  -- 141/76 -- -- -- --   06/30/22 0400 98 6 °F (37 °C) 53 Abnormal  -- 143/68 -- -- -- --   06/30/22 0345 -- 54 Abnormal  -- 144/78 -- -- -- --   06/30/22 0330 -- 54 Abnormal  -- 136/77 -- -- -- --   06/30/22 0315 -- 53 Abnormal  -- 148/77 -- -- -- --   06/30/22 0300 98 °F (36 7 °C) 52 Abnormal  -- 147/75 -- -- -- --   06/30/22 0245 -- 54 Abnormal  -- 134/76 -- -- -- --   06/30/22 0230 -- 53 Abnormal  -- 124/68 -- -- -- --   06/30/22 0215 -- 53 Abnormal  -- 138/73 -- -- -- --   06/30/22 0200 -- 55 -- 134/73 -- -- -- --   06/30/22 0145 -- 53 Abnormal  -- 114/71 -- -- -- --   06/30/22 0130 -- 57 -- 112/69 -- -- -- --   06/30/22 0115 -- 62 -- 111/65 -- -- -- --   06/30/22 0100 98 8 °F (37 1 °C) 60 -- 103/63 -- -- -- --   06/30/22 0045 -- 65 -- 115/72 -- -- -- --   06/30/22 0030 -- 51 Abnormal  -- 111/67 -- -- -- --   06/30/22 0015 -- 55 -- 112/57 -- -- -- --   06/30/22 0000 98 4 °F (36 9 °C) 55 -- 114/56 -- -- -- --   06/29/22 2305 98 °F (36 7 °C) -- -- -- -- -- -- --   06/29/22 2300 -- 70 -- 97/55 -- -- -- --   06/29/22 2245 -- 65 -- 101/55 -- -- -- --   06/29/22 2232 -- 75 -- 108/57 -- -- -- --   06/29/22 2216 -- 63 -- 118/78 -- -- -- --   06/29/22 2200 98 1 °F (36 7 °C) 66 -- 119/65 -- -- -- --   06/29/22 2146 -- 67 -- 124/66 -- -- -- --   06/29/22 2131 -- 61 -- 115/63 -- -- -- --   06/29/22 2115 -- 63 -- 116/73 -- -- -- --   06/29/22 2100 -- 57 -- 116/69 -- -- -- --   06/29/22 2045 -- 64 -- 116/65 -- -- -- --   06/29/22 2031 -- 75 -- 123/63 -- -- -- -- 06/29/22 2026 98 3 °F (36 8 °C) -- -- -- -- -- -- --   06/29/22 2015 -- 64 -- 118/73 -- -- -- --   06/29/22 2000 -- 66 -- 124/71 -- -- -- --   06/29/22 1945 -- 60 -- 113/65 -- -- -- --   06/29/22 1931 -- 64 -- 112/69 -- -- -- --   06/29/22 1915 -- 59 -- 114/69 -- -- -- --   06/29/22 1900 -- 73 -- 117/73 -- -- -- --   06/29/22 1845 -- 71 -- 129/70 -- -- -- --   06/29/22 1830 -- 62 -- 115/67 -- -- -- --   06/29/22 1815 -- 92 -- 128/68 -- -- -- --   06/29/22 1801 98 °F (36 7 °C) 68 -- 121/71 -- -- -- --   06/29/22 1745 -- 65 -- 119/74 -- -- -- --   06/29/22 1730 -- 65 -- 121/75 -- -- -- --   06/29/22 1715 -- 64 -- 122/70 -- -- -- --   06/29/22 1700 98 °F (36 7 °C) 61 -- 121/70 -- -- -- --   06/29/22 1645 -- 63 -- 122/72 -- -- -- --   06/29/22 1630 -- 62 -- 127/76 -- -- -- --   06/29/22 1615 -- 66 -- 118/70 -- -- -- --   06/29/22 1601 98 2 °F (36 8 °C) 67 -- 118/73 -- -- -- --   06/29/22 1546 -- 68 -- 114/61 -- -- -- --   06/29/22 1530 -- 100 -- 116/57 -- -- -- --   06/29/22 1515 -- 75 -- 119/65 -- -- -- --   06/29/22 1500 97 8 °F (36 6 °C) 74 -- 122/68 -- -- -- --   06/29/22 1446 -- 71 -- 114/66 -- -- -- --   06/29/22 1445 -- 71 -- 114/66 -- -- -- --   06/29/22 1430 -- 76 -- 114/62 -- -- -- --   06/29/22 1415 -- 69 -- 116/62 -- -- -- --   06/29/22 1400 98 1 °F (36 7 °C) 71 -- 112/67 -- -- -- --   06/29/22 1345 -- 71 -- 112/65 -- -- -- --   06/29/22 1330 -- 83 -- 109/70 -- -- -- --   06/29/22 1315 -- 70 -- 109/68 -- -- -- --   06/29/22 1302 97 9 °F (36 6 °C) 99 -- 116/71 -- -- -- --   06/29/22 1246 -- 95 -- 114/71 -- -- -- --   06/29/22 1230 -- 90 -- 112/59 -- -- -- --   06/29/22 1215 -- 85 -- 114/58 -- -- -- --   06/29/22 1200 98 °F (36 7 °C) 97 -- 113/59 -- -- -- --   06/29/22 1145 -- 98 -- 113/57 -- -- -- --   06/29/22 1130 -- 93 -- 119/65 -- -- -- --   06/29/22 1115 -- 66 -- 112/69 -- -- -- --   06/29/22 1112 -- 72 -- 112/58 -- -- -- --   06/29/22 1109 -- 67 -- 113/57 -- -- -- --   06/29/22 1106 -- 73 -- 115/56 -- -- -- --   06/29/22 1105 -- 73 -- 115/56 -- -- -- --   06/29/22 1103 -- 65 -- 119/70 -- -- -- --   06/29/22 1100 98 1 °F (36 7 °C) 78 -- 118/80 -- -- -- --   06/29/22 1057 -- 88 -- 133/78 -- -- -- --   06/29/22 1054 -- 74 -- 128/71 -- -- -- --   06/29/22 1051 -- 60 -- 123/75 -- -- -- --   06/29/22 1048 -- 55 -- 120/72 -- -- -- --   06/29/22 1045 -- 61 -- 120/72 -- -- -- --   06/29/22 1042 -- 83 -- 125/83 -- -- -- --   06/29/22 1041 -- 83 -- 125/83 -- -- -- --   06/29/22 1039 -- 68 -- 126/58 -- -- -- --   06/29/22 1036 -- 68 -- 146/65 -- -- -- --   06/29/22 1032 -- 62 -- 113/67 -- -- -- --   06/29/22 1030 -- 68 -- 117/65 -- -- -- --   06/29/22 1027 -- 67 -- 115/63 -- -- -- --   06/29/22 1023 -- 67 -- 124/66 -- -- -- --   06/29/22 1017 -- 93 -- -- 99 % -- -- --   06/29/22 1012 -- 110 Abnormal  -- -- 97 % -- -- --   06/29/22 1007 -- 72 -- -- 98 % -- -- --   06/29/22 1002 -- 77 -- -- 98 % -- -- --   06/29/22 0957 -- 79 -- -- 98 % -- -- --   06/29/22 0952 -- 82 -- -- 100 % -- -- --   06/29/22 0951 -- 80 -- -- 90 % -- -- --   06/29/22 0950 -- 82 -- 141/85 -- -- -- --   06/29/22 0436 -- 67 -- 116/76 -- -- -- --   06/29/22 0420 -- 77 -- 114/76 -- -- -- --   06/29/22 0406 -- 69 -- 118/75 -- -- -- --   06/29/22 0350 -- 67 -- 118/76 -- -- -- --   06/29/22 0336 -- 63 -- 119/76 -- -- -- --   06/29/22 0117 -- 79 -- 116/58 -- -- -- --   06/29/22 0101 -- 88 -- 105/75 -- -- -- --   06/29/22 0046 -- 76 -- 110/78 -- -- -- --   06/29/22 0031 -- 89 -- 112/75 -- -- -- --   06/29/22 0015 -- 78 -- 124/71 -- -- -- --   06/28/22 2142 98 2 °F (36 8 °C) 96 16 124/84 -- -- -- Lying     Pertinent Labs/Diagnostic Test Results:   Results from last 7 days   Lab Units 06/28/22  2204   WBC Thousand/uL 8 70   HEMOGLOBIN g/dL 12 8   HEMATOCRIT % 38 1   PLATELETS Thousands/uL 97*   NEUTROS ABS Thousands/µL 6 86     Results from last 7 days   Lab Units 06/24/22  1147   SODIUM mmol/L 137   POTASSIUM mmol/L 3 9   CHLORIDE mmol/L 108   CO2 mmol/L 20* ANION GAP mmol/L 9   BUN mg/dL 5   CREATININE mg/dL 0 66   EGFR ml/min/1 73sq m 118   CALCIUM mg/dL 9 2     Results from last 7 days   Lab Units 06/24/22  1147   AST U/L 23   ALT U/L 23   ALK PHOS U/L 205*   TOTAL PROTEIN g/dL 6 5   ALBUMIN g/dL 2 6*   TOTAL BILIRUBIN mg/dL 0 45   BILIRUBIN DIRECT mg/dL 0 09     Results from last 7 days   Lab Units 06/24/22  1147   GLUCOSE RANDOM mg/dL 90     Results from last 7 days   Lab Units 06/24/22  1147   PROTIME seconds 12 8   INR  1 00   PTT seconds 29     Results from last 7 days   Lab Units 06/24/22  1147   CREATININE UR mg/dL 65 7   PROTEIN UR mg/dL 8   PROT/CREAT RATIO UR  0 12*     Past Medical History:   Diagnosis Date    Known health problems: none     Varicella     chicken pox     Present on Admission:   Thrombocytopenia complicating pregnancy (HCC)   Abnormal glucose tolerance test (GTT) during pregnancy, antepartum   Excessive weight gain affecting pregnancy      Admitting Diagnosis: Encounter for full-term uncomplicated delivery [K31]  Age/Sex: 29 y o  female  Admission Orders:  Regular diet  Lumbar epidural  Up as tolerated  Abd binder  Continuous pulse Ox  IS  [place] SCDs    Scheduled Medications:  acetaminophen, 650 mg, Oral, Q6H NAVEED  docusate sodium, 100 mg, Oral, BID  [START ON 7/1/2022] enoxaparin, 40 mg, Subcutaneous, Daily      Continuous IV Infusions:  lactated ringers, 125 mL/hr, Intravenous, Continuous      PRN Meds:  benzocaine-menthol-lanolin-aloe, 1 application, Topical, K0H PRN  calcium carbonate, 1,000 mg, Oral, Daily PRN  dexamethasone, 8 mg, Intravenous, Once PRN  diphenhydrAMINE, 25 mg, Oral, Q6H PRN  hydrocortisone, 1 application, Topical, Daily PRN  [START ON 7/1/2022] ibuprofen, 600 mg, Oral, Q6H PRN  ketorolac, 15 mg, Intravenous, Q6H PRN  nalbuphine, 2 mg, Intravenous, 4x Daily PRN  naloxone, 0 1 mg, Intravenous, Q3 min PRN  ondansetron, 4 mg, Intravenous, Q4H PRN  ondansetron, 4 mg, Intravenous, Q8H PRN  [START ON 7/1/2022] oxyCODONE, 10 mg, Oral, Q4H PRN  [START ON 7/1/2022] oxyCODONE, 5 mg, Oral, Q4H PRN  oxyCODONE-acetaminophen, 1 tablet, Oral, Q6H PRN  simethicone, 80 mg, Oral, 4x Daily PRN  witch hazel-glycerin, 1 pad, Topical, Q4H PRN        None    Network Utilization Review Department  ATTENTION: Please call with any questions or concerns to [phone] and carefully listen to the prompts so that you are directed to the right person  All voicemails are confidential   Lisette Malone all requests for admission clinical reviews, approved or denied determinations and any other requests to dedicated fax number below belonging to the campus where the patient is receiving treatment   List of dedicated fax numbers for the Facilities:  [address] DENIALS (Administrative/Medical Necessity) [phone]   [address] (Maternity/NICU/Pediatrics) [phone]   [address] [place] [address] [address] [place] [address]  [address] [facility] [place] [address] [address] [place] [address] [address] [place] [address] [facility] [phone]

## 2022-06-30 NOTE — OB LABOR/OXYTOCIN SAFETY PROGRESS
Labor Progress Note - Petty Tomlinson 29 y o  female MRN: [id number]    Unit/Bed#: L&D 322-01 Encounter: [id number]    Dose (amaya-units/min) Oxytocin: 0 amaya-units/min (Per Dr Nicole Garza, for c/s prep)  Contraction Frequency (minutes): 1-3  Contraction Quality: Moderate  Tachysystole: No   Cervical Dilation: 6        Cervical Effacement: 80  Fetal Station: -1  Baseline Rate: 150 bpm  Fetal Heart Rate: 150 BPM  FHR Category: Category II               Vital Signs:   Vitals:    22 0715   BP: 129/71   Pulse: 67   Resp:    Temp:    SpO2:        Notes/comments:   SAFETY HUDDLE:  TRIGGER: Patient meets criteria for Failed Induction of Labor    PARTICIPANTS: Kandi Tolbert RN, Mitesh Marrufo    REVIEW OF CURRENT PLAN OF CARE AND MANAGEMENT:   Kristopher Mello is in the active phase of labor  The cervical exam has remained unchanged and membranes have been ruptured and Oxytocin has been running for approximately 16 hours  During this time, Oxytocin was halved for a cumulative duration of approximately 1 hour due to recurrent late decelerations  Currently, the contraction pattern shows contractions every 3 minutes  The cervical exam has remained unchanged at 6/80/-1 since approximately 7 hours ago  Maternal status is stable  Fetal status shows a Category 2 FHR tracing  I recommend delivery by  section for Failed Induction of Labor  I have discussed the risks and benefits of  delivery with Kristopher Mello, including infection and damage to surrounding structures such as bowel, bladder etc  Also discussed increased risk of bleeding due to prolonged amount of time on pitocin as well as suspected fetal macrosomia  Explained that she will be given antibiotics to decrease infection risk and that we will turn pitocin off now to allow for increased postoperative response   Anesthesia will evaluate for adequate pain control and anticipate patient able to be awake however she was also counseled on the possibility of needing general anesthesia if not able to achieve adequate analgesia  Dr Bruce Amanda also involved in counseling discussion at bedside  Pitocin turned off, antibiotics ordered, anesthesia made aware      TIMELINE FOR NEXT ASSESSMENT: proceed to OR for 1LTCS    ALL PARTICIPANTS IN AGREEMENT WITH PLAN OF CARE: Yes    IS PERRT REQUIRED: No       Anita Tavera MD 6/30/2022 7:38 AM

## 2022-06-30 NOTE — ANESTHESIA POSTPROCEDURE EVALUATION
Post-Op Assessment Note    CV Status:  Stable  Pain Score: 2    Pain management: adequate     Mental Status:  Alert and awake   Hydration Status:  Euvolemic   PONV Controlled:  Controlled   Airway Patency:  Patent      Post Op Vitals Reviewed: Yes      Staff: Anesthesiologist     Post-op block assessment: no complications      No complications documented      BP      Temp      Pulse     Resp      SpO2      /60   Pulse 78   Temp 98 8 °F (37 1 °C) (Oral)   Resp 16   Ht 5' 2" (1 575 m)   Wt 77 5 kg (170 lb 12 8 oz)   LMP 09/29/2021   SpO2 99%   BMI 31 24 kg/m²

## 2022-06-30 NOTE — QUICK NOTE
Progress Note - OB/GYN  Vidya Lizama 29 y o  female MRN: [id number]  Unit/Bed#: L&D 311-01 Encounter: [id number]      Subjective:  Patient overall feeling well, reporting some ongoing itching status despite receiving Benadryl in [place], also having flare-up of her carpal tunnel syndrome, baby in room doing well    Pain: no  Tolerating PO: yes  Voiding: manzano in place, UOP adequate  Flatus: no  BM: no  Ambulating: no  Breastfeeding:  yes  Chest pain: no  Shortness of breath: no  Leg pain: no  Lochia: wnl    Vitals:   /62 (BP Location: Left arm)   Pulse 79   Temp 98 6 °F (37 °C) (Oral)   Resp 18   Ht 5' 2" (1 575 m)   Wt 77 5 kg (170 lb 12 8 oz)   LMP 09/29/2021   SpO2 93%   Breastfeeding Yes   BMI 31 24 kg/m²       Intake/Output Summary (Last 24 hours) at 6/30/2022 1618  Last data filed at 6/30/2022 1500  Gross per 24 hour   Intake 7800 ml   Output 6696 ml   Net 1104 ml       Invasive Devices  Timeline    Peripheral Intravenous Line  Duration           Peripheral IV 06/28/22 Dorsal (posterior); Right Forearm 1 day          Drain  Duration           Urethral Catheter Non-latex 16 Fr  1 day                Physical Exam:   GEN: Vidya Lizama appears well, alert and oriented x 3, pleasant and cooperative   ABDOMEN: soft, no tenderness, no distention, fundus @ U -1, Incision C/D/I  EXTREMITIES: SCDs on      Labs:   Admission on 06/28/2022   Component Date Value    ABO Grouping 06/28/2022 A     Rh Factor 06/28/2022 Positive     Antibody Screen 06/28/2022 Negative     Specimen Expiration Date 06/28/2022 [id number]     WBC 06/28/2022 8 70     RBC 06/28/2022 4 27     Hemoglobin 06/28/2022 12 8     Hematocrit 06/28/2022 38 1     MCV 06/28/2022 89     MCH 06/28/2022 30 0     MCHC 06/28/2022 33 6     RDW 06/28/2022 14 6     MPV 06/28/2022 13 3 (A)    Platelets 64/49/7946 97 (A)    nRBC 06/28/2022 0     Neutrophils Relative 06/28/2022 79 (A)    Immat GRANS % 06/28/2022 1     Lymphocytes Relative 2022 17     Monocytes Relative 2022 3 (A)    Eosinophils Relative 2022 0     Basophils Relative 2022 0     Neutrophils Absolute 2022 6 86     Immature Grans Absolute 2022 0 04     Lymphocytes Absolute 2022 1 46     Monocytes Absolute 2022 0 28     Eosinophils Absolute 2022 0 03     Basophils Absolute 2022 0 03     RPR 2022 Non-Reactive     pH, Cord Dell 2022 7 285     pCO2, Cord Dell 2022 47 8 (A)    pO2, Cord Dell 2022 14 2 (A)    HCO3, Cord Dell 2022 22 2     Base Exc, Cord Dell 2022 -4 7 (A)    O2 Cont, Cord Dell 2022 4 8     O2 HGB,VENOUS CORD 2022 22 0     pH, Cord Art 2022 7 250     pCO2, Cord Art 2022 51 7     pO2, Cord Art 2022 20 2     HCO3, Cord Art 2022 22 2     Base Exc, Cord Art 2022 -5 6 (A)    O2 Content, Cord Art 2022 8 9     O2 Hgb, Arterial Cord 2022 39 9          Patient Active Problem List   Diagnosis    Status post primary low transverse  section    Thrombocytopenia complicating pregnancy ([address] )    Abnormal glucose tolerance test (GTT) during pregnancy, antepartum    Anxiety in pregnancy in third trimester, antepartum    Excessive weight gain affecting pregnancy        Assessment:  Postop Day #0 s/p RLTCS for arrest of dilation, stable    Plan:  1) Gestational thrombocytopenia   Plt 97 on admit  2) gHTN (diagnosed intrapartum)   P:C 0 12, CBC wnl  3) Postoperative care   Hgb 12 8g/dL --> AM CBC ordered   Urinary output adequate, manzano to be removed in the morning   Encourage ambulation   Encourage breastfeeding   Anticipate discharge POD # 2 vs 3    Ros Ferguson MD  2022  4:18 PM

## 2022-06-30 NOTE — DISCHARGE SUMMARY
Discharge Summary   Melissa Engle 29 y o  female MRN: [id number]  Unit/Bed#: L&D 165-00 Encounter: [id number]    Admission Date: 2022     Discharge Date: 2022    Admitting Attending: Arsh Mayer MD  Delivering Attending: Damion Hill MD  Discharge Attending: Chai Goldberg MD    Diagnoses:  Pregnancy at 39w1d  Arrest of dilation  Gestational thrombocytopenia  Gestational hypertension    Procedures: Primary low transverse  section     Complications: none apparent     Melissa Oniel was admitted as a 29 y o  Tabitha Dull with an WAYNE of Estimated Date of Delivery: 22 at 39w1d for elective induction of labor  She underwent cervical ripening with Cytotec and a Olvera balloon and then received an epidural   After epidural placement, recurrent late decelerations were noted on the fetal heart tracing so she was ruptured for placement of an FSE and IUPC  Due to persistent decelerations terbutaline was given as well with return of the fetal heart tracing to category 1  Pitocin was started and she made some cervical change however an amnioinfusion infusion was started for recurrent variable decelerations  After 6 hours at 6/80/-1 with intermittently adequate contractions, patient met criteria for arrest of dilation and decision was made to proceed with primary  section  Of note, patient also met criteria for gestational hypertension during her labor course  There was no clinical or laboratory evidence of preeclampsia  She underwent an uncomplicated  delivery  She delivered a viable male  at 18 on 2022  Weight was 8lbs 5oz with APGARs of 9 and 9 at 1 and 5 minutes  Her preoperative hemoglobin was 12 8g/dL  Her postoperative hemoglobin was 10 6g/dL  Thrombocytopenia was stable postoperative  Condition at discharge: good     On day of discharge pain was well controlled, patient was tolerating PO, and had appropriate bowel function   She was discharged with standard post partum/ post operative instructions to follow up with her physician in 1 week for an incision check and in 3-6 weeks for a postpartum appointment  Discharge instructions/Information to patient and family:   -Do not place anything (no partner, tampons or douche) in your vagina for 6 weeks  -You may walk for exercise for the first 6 weeks then gradually return to your usual activities    -Please do not drive for 1 week if you have no stitches and for 2 weeks if you have stitches or underwent a  delivery     -You may take baths or shower per your preference    -Please look at your bust (breasts) in the mirror daily and call for redness or tenderness or increased warmth    -Please call us for temperature > 100 4*F or 38* C, worsening pain or a foul discharge  Discharge Medications:   Prenatal vitamin daily for 6 months or the duration of nursing whichever is longer  Motrin 600 mg orally every 6 hours as needed for pain  Tylenol (over the counter) per bottle directions as needed for pain: do NOT use with percocet  Hydrocortisone cream 1% (over the counter) applied 1-2x daily to hemorrhoids as needed  Percocet as needed    Provisions for Follow-Up Care: Follow up with your doctor in 1 week for incision site check  Planned Readmission: lita Desouza MD  PGY-III, OB/GYN  2022, 8:00 AM    I personally saw patient on date of discharge , discharge instructions reviewed

## 2022-06-30 NOTE — OB LABOR/OXYTOCIN SAFETY PROGRESS
Oxytocin Safety Progress Check Note - Bola Escobar 29 y o  female MRN: [id number]    Unit/Bed#: L&D 322-01 Encounter: [id number]    Dose (amaya-units/min) Oxytocin: 8 amaya-units/min  Contraction Frequency (minutes): 2-3  Contraction Quality: Moderate  Tachysystole: No     Cervical Dilation: 5  Cervical Effacement: 80  Fetal Station: -1     Baseline Rate: 140 bpm  Fetal Heart Rate: 140 BPM  FHR Category: Category II    Pt resting comfortably  Unchanged on SVE  Last documented change at 1923, AROM around 1000 today  MVUs not adequate, pit at 8  SVE with variable decelerations at this time; patient repositioned to left and then to right  If variables do not resolve, will start amnioinfusion      Vital Signs:   Vitals:    06/29/22 2100   BP: 116/69   Pulse: 57   Resp:    Temp:    SpO2:      D/w Dr Shayla Roldan MD 6/29/2022 10:33 PM

## 2022-06-30 NOTE — OB LABOR/OXYTOCIN SAFETY PROGRESS
Oxytocin Safety Progress Check Note - Ginette Saint 29 y o  female MRN: [id number]    Unit/Bed#: L&D 322-01 Encounter: [id number]    Dose (amaya-units/min) Oxytocin: 12 amaya-units/min  Contraction Frequency (minutes): 2-3  Contraction Quality: Moderate  Tachysystole: No     Cervical Dilation: 6  Cervical Effacement: 80  Fetal Station: -1     Baseline Rate: 135 bpm  Fetal Heart Rate: 135 BPM  FHR Category: Category I    Pt resting comfortably  FHT Cat 1, Ctx q2-3, intermittently adequate MVUs  6cm at 032 625 76 89, will re-assess in 2 hours for change      Vital Signs:  Vitals:    06/30/22 0345   BP: 144/78   Pulse: (!) 54   Resp:    Temp:    SpO2:      D/w Dr Romana Crowley MD 6/30/2022 4:19 AM

## 2022-06-30 NOTE — OP NOTE
OPERATIVE REPORT  PATIENT NAME: Reyes Cones    :  1990  MRN: [id number]  Pt Location: [facility] L&D OR ROOM 01    SURGERY DATE: 2022    Surgeon(s) and Role:     * Ana Santiago MD - Primary     * Quita Aragon MD - [address] [place], [place] - Co-surgeon    Preop Diagnosis:  Pregnancy at 39w1d  Arrest of dilation  Gestational thrombocytopenia  gHTN ((intrapartum)    Procedure(s) (LRB):   SECTION () (N/A)    Specimen(s):  ID Type Source Tests Collected by Time Destination   A :  Cord Blood Cord BLOOD GAS, VENOUS, CORD, BLOOD GAS, ARTERIAL, CORD Ana Santiago MD 2022 1588    B :  Tissue (Placenta on Hold) OB Only Placenta PLACENTA IN STORAGE Ana Santiago MD 2022 0818        Estimated Blood Loss:   500cc    Brief History  Reyes Cones is a 29 y o   at 39w1d admitted for elective induction of labor  She underwent cervical ripening with Cytotec and a Olvera balloon and then received an epidural   After epidural placement, recurrent late decelerations were noted on the fetal heart tracing so she was ruptured for placement of an FSE and IUPC  Due to persistent decelerations terbutaline was given as well with return of the fetal heart tracing to category 1  Pitocin was started and she made some cervical change however an amnioinfusion infusion was started for recurrent variable decelerations  After 6 hours at 6/80/-1 with intermittently adequate contractions, patient met criteria for arrest of dilation and decision was made to proceed with primary  section  Operative Indications:  Primary low transverse  section for arrest of dilation    Attending Surgeon: Dr Rosa Ruffin  Resident Surgeon: Dr Luke Collet    Operative Findings:  1  Viable male  at 39w1d with APGARs of 9 and 9 at 1 and 5 minutes  Fetus weighted 8lb 5oz  2  Clear amniotic fluid  3  Normal intact placenta with centrally inserted 3VC    4  Normal uterus, bilateral tubes and ovaries  5  Adhesions absent    Umbilical Cord Venous Blood Gas:  Results from last 7 days   Lab Units 06/30/22  0817   PH COV  7 285   PCO2 COV mm HG 47 8*   HCO3 COV mmol/L 22 2   BASE EXC COV mmol/L -4 7*   O2 CT CD VB mL/dL 4 8   O2 HGB, VENOUS CORD % 95 3     Umbilical Cord Arterial Blood Gas:  Results from last 7 days   Lab Units 06/30/22  0817   PH COA  7 250   PCO2 COA  51 7   PO2 COA mm HG 20 2   HCO3 COA mmol/L 22 2   BASE EXC COA mmol/L -5 6*   O2 CONTENT CORD ART ml/dl 8 9   O2 HGB, ARTERIAL CORD % 39 9       Operative Technique  The patient was taken to the operating room  Epidural anesthesia was adequately established and Ancef/azithromycin were given for preoperative prophylaxis  The patient was then placed in the dorsal supine position with a left tilt of the hips  The patient was then prepped with betadine for vaginal prep and chloraprep for abdominal prep and draped in the usual sterile fashion for a Pfannenstiel skin incision  A time out was performed to confirm correct patient and correct procedure  An incision was made in the skin with a surgical scalpel and sharp dissection was carried out over subsequent layers of tissue including the fascia, followed by the Bovie electrocautery for hemostasis  The fascia was incised at the midline and the fascial incision was extended bilaterally using the curved Connors scissors  The superior edge of the fascial incision was grasped with Kocher clamps, tented up and the underlying rectus muscles were dissected off bluntly and sharply using the curved Connors scissors  The same was done inferiorly  The rectus muscles were then divided at midline and the peritoneum was identified, tented up at its upper margin taking care to avoid the bladder, and then entered  The peritoneal incision was extended superiorly and inferiorly       The bladder blade was inserted and a transverse incision was made in the lower uterine segment using a new surgical blade  The uterine incision was extended cephalad and caudal using blunt dissection  The amniotic sac was entered  The surgeon's hand was placed into the uterine cavity  The fetal head was identified and elevated through the uterine incision with the assistance of fundal pressure  With gentle traction, the shoulder was delivered, followed by the rest of the fetal body  There was a loop of cord noted to be wrapped around the fetal body  On delivery the cord was doubly clamped and cut after delayed cord clamping  The infant was then passed off the table to the awaiting  staff  The  was noted to cry spontaneously and moved all extremities  Venous and arterial blood gas, cord blood, and portion of cord was obtained for analysis and routine blood testing  The placenta delivery was then sent to storage  Placenta was noted to be intact with a centrally inserted three-vessel cord  Oxytocin was administered by IV infusion to enhance uterine contraction  TXA was also given prophylactically due to the patient's increased risk for postpartum hemorrhage  The uterus was exteriorized and cleared of all clots and remaining products of conception  The uterus was noted to be atonic so a dose of Methergine was given to enhance uterine contraction  The uterine incision was re approximated using an 0 Vicryl in a running locked fashion  A second vertical imbricating stitch with the same suture was applied  An additional figure-of-eight stitch was placed over an area of bleeding  The uterine incision was examined and noted to be hemostatic  The posterior cul-de-sac was irrigated a and cleared of all clots and products of conception  The uterus was replaced into the abdomen and the paracolic gutters were cleared of all clots  The uterine incision was once again reexamined and noted to be hemostatic  The peritoneum was then reapproximated using a a running nonlocked stitch of 2-0 chromic gut suture    The fascia was re approximated using an 0 Vicryl in a running nonlocked fashion  The subcutaneous tissue was irrigated and cleared of all clots and debris  Good hemostasis was noted with Bovie electrocautery  The subcutaneous tissue was reapproximated with 2-0 Vicryl  The skin incision was closed using 4-0 Monocryl with exofin  Good hemostasis was noted  Patient tolerated the procedure well  All needle, sponge, and instrument counts were noted to be correct x 2 at the end of the procedure  Patient was transferred to the recovery room in stable condition  Dr Caridad Kaiser was present and participated in the entire surgery        Patient Disposition:  Postpartum room      SIGNATURE: Ros Ferguson MD  DATE: June 30, 2022  TIME: 9:14 AM

## 2022-07-01 ENCOUNTER — TELEPHONE (OUTPATIENT)
Dept: FAMILY MEDICINE CLINIC | Facility: CLINIC | Age: 32
End: 2022-07-01

## 2022-07-01 LAB
BASOPHILS # BLD AUTO: 0.02 THOUSANDS/ΜL (ref 0–0.1)
BASOPHILS NFR BLD AUTO: 0 % (ref 0–1)
EOSINOPHIL # BLD AUTO: 0.03 THOUSAND/ΜL (ref 0–0.61)
EOSINOPHIL NFR BLD AUTO: 0 % (ref 0–6)
ERYTHROCYTE [DISTWIDTH] IN BLOOD BY AUTOMATED COUNT: 15.1 % (ref 11.6–15.1)
HCT VFR BLD AUTO: 34.6 % (ref 34.8–46.1)
HGB BLD-MCNC: 11.1 G/DL (ref 11.5–15.4)
IMM GRANULOCYTES # BLD AUTO: 0.09 THOUSAND/UL (ref 0–0.2)
IMM GRANULOCYTES NFR BLD AUTO: 1 % (ref 0–2)
LYMPHOCYTES # BLD AUTO: 1.18 THOUSANDS/ΜL (ref 0.6–4.47)
LYMPHOCYTES NFR BLD AUTO: 10 % (ref 14–44)
MCH RBC QN AUTO: 30.2 PG (ref 26.8–34.3)
MCHC RBC AUTO-ENTMCNC: 32.1 G/DL (ref 31.4–37.4)
MCV RBC AUTO: 94 FL (ref 82–98)
MONOCYTES # BLD AUTO: 0.56 THOUSAND/ΜL (ref 0.17–1.22)
MONOCYTES NFR BLD AUTO: 5 % (ref 4–12)
NEUTROPHILS # BLD AUTO: 10.16 THOUSANDS/ΜL (ref 1.85–7.62)
NEUTS SEG NFR BLD AUTO: 84 % (ref 43–75)
NRBC BLD AUTO-RTO: 0 /100 WBCS
PLATELET # BLD AUTO: 78 THOUSANDS/UL (ref 149–390)
PMV BLD AUTO: 13.2 FL (ref 8.9–12.7)
RBC # BLD AUTO: 3.67 MILLION/UL (ref 3.81–5.12)
WBC # BLD AUTO: 12.04 THOUSAND/UL (ref 4.31–10.16)

## 2022-07-01 PROCEDURE — 85025 COMPLETE CBC W/AUTO DIFF WBC: CPT

## 2022-07-01 PROCEDURE — 99024 POSTOP FOLLOW-UP VISIT: CPT | Performed by: OBSTETRICS & GYNECOLOGY

## 2022-07-01 RX ADMIN — DOCUSATE SODIUM 100 MG: 100 CAPSULE, LIQUID FILLED ORAL at 17:16

## 2022-07-01 RX ADMIN — ACETAMINOPHEN 650 MG: 325 TABLET, FILM COATED ORAL at 12:15

## 2022-07-01 RX ADMIN — IBUPROFEN 600 MG: 600 TABLET, FILM COATED ORAL at 21:44

## 2022-07-01 RX ADMIN — KETOROLAC TROMETHAMINE 15 MG: 30 INJECTION, SOLUTION INTRAMUSCULAR; INTRAVENOUS at 00:32

## 2022-07-01 RX ADMIN — DOCUSATE SODIUM 100 MG: 100 CAPSULE, LIQUID FILLED ORAL at 08:57

## 2022-07-01 RX ADMIN — ACETAMINOPHEN 650 MG: 325 TABLET, FILM COATED ORAL at 06:12

## 2022-07-01 RX ADMIN — KETOROLAC TROMETHAMINE 15 MG: 30 INJECTION, SOLUTION INTRAMUSCULAR; INTRAVENOUS at 06:34

## 2022-07-01 RX ADMIN — ACETAMINOPHEN 650 MG: 325 TABLET, FILM COATED ORAL at 17:16

## 2022-07-01 RX ADMIN — ACETAMINOPHEN 650 MG: 325 TABLET, FILM COATED ORAL at 00:32

## 2022-07-01 NOTE — PLAN OF CARE

## 2022-07-01 NOTE — TELEPHONE ENCOUNTER
Patient called, she had her Baby Boy on 6/30/22, his named is Nuha Bennett @ 39 weeks  She will be calling or a discharge nurse to contact us about an appt  For Massiel Crawford  Per Dr Rosie Graff, let her know and we will found a appt slot for him

## 2022-07-01 NOTE — PROGRESS NOTES
Progress Note - OB/GYN  Ginette Saint 29 y o  female MRN: [id number]  Unit/Bed#: L&D 311-01 Encounter: [id number]    Assessment and Plan     Ginette Saint is a patient of: [facility]  She is POD# 1 s/p 1LTCS  Recovering well and is stable       Thrombocytopenia complicating pregnancy ([address] )  Assessment & Plan  Most recent PLT 97 ->78  Bleeding stable  lovenox held     * Status post primary low transverse  section  Assessment & Plan  Pain well controlled will transition to PO meds today  Olvera in place to be removed this AM followed by void trial  Ambulating  Passing flatus  Tolerating PO      Disposition    - Anticipate discharge home on POD# 2 vs 3      Subjective/Objective     Chief Complaint: Postoperative State     Subjective:    Ginette Saint is s/p 1LTCS   She is POD# 1  She has no current complaints  Pain is well controlled  Olvera still in place, to be removed this AM followed by void trial   She is ambulating  Patient is currently passing flatus and has had no bowel movement  She is tolerating PO, and denies nausea or vomitting  Patient denies fever, chills, chest pain, shortness of breath, or calf tenderness  Lochia is normal  She is  Breastfeeding  Her incision is C/D/I  She is recovering well and is stable  Vitals:   /58 (BP Location: Right arm)   Pulse 83   Temp 97 6 °F (36 4 °C) (Temporal)   Resp 18   Ht 5' 2" (1 575 m)   Wt 77 5 kg (170 lb 12 8 oz)   LMP 2021   SpO2 95%   Breastfeeding Yes   BMI 31 24 kg/m²       Intake/Output Summary (Last 24 hours) at 2022 0645  Last data filed at 2022 0601  Gross per 24 hour   Intake 2800 ml   Output 3321 ml   Net -521 ml       Invasive Devices  Timeline    Peripheral Intravenous Line  Duration           Peripheral IV 22 Dorsal (posterior); Right Forearm 2 days                Physical Exam:   GEN: Ginette Saint appears well, alert and oriented x 3, pleasant and cooperative   CARDIO: RRR, no murmurs or rubs  RESP:  CTAB, no wheezes or rales  ABDOMEN: soft, focal RLQ tenderness, no distention, Incision C/D/I  EXTREMITIES: SCDs on, Negative Víctor's sign bilaterally      Labs:     Hemoglobin   Date Value Ref Range Status   07/01/2022 11 1 (L) 11 5 - 15 4 g/dL Final   06/28/2022 12 8 11 5 - 15 4 g/dL Final     WBC   Date Value Ref Range Status   07/01/2022 12 04 (H) 4 31 - 10 16 Thousand/uL Final   06/28/2022 8 70 4 31 - 10 16 Thousand/uL Final     Platelets   Date Value Ref Range Status   07/01/2022 78 (L) 149 - 390 Thousands/uL Final     Comment:     no clots   06/28/2022 97 (L) 149 - 390 Thousands/uL Final     Creatinine   Date Value Ref Range Status   06/24/2022 0 66 0 60 - 1 30 mg/dL Final     Comment:     Standardized to IDMS reference method   01/13/2022 0 52 (L) 0 60 - 1 30 mg/dL Final     Comment:     Standardized to IDMS reference method     AST   Date Value Ref Range Status   06/24/2022 23 5 - 45 U/L Final     Comment:     Specimen collection should occur prior to Sulfasalazine administration due to the potential for falsely depressed results  01/13/2022 13 5 - 45 U/L Final     Comment:     Specimen collection should occur prior to Sulfasalazine administration due to the potential for falsely depressed results  ALT   Date Value Ref Range Status   06/24/2022 23 12 - 78 U/L Final     Comment:     Specimen collection should occur prior to Sulfasalazine and/or Sulfapyridine administration due to the potential for falsely depressed results  01/13/2022 18 12 - 78 U/L Final     Comment:     Specimen collection should occur prior to Sulfasalazine and/or Sulfapyridine administration due to the potential for falsely depressed results             Swapna Kruse MD   PGY-1 OB/GYN  7/1/2022  6:45 AM

## 2022-07-01 NOTE — LACTATION NOTE
This note was copied from a baby's chart  Discussed DBM vs formula  Discussed pacifier/finger for suck training, chin support if using bottle and cup feeding  Kesha Bennett reports Zaida Ennis still not latching well to the breast  She says the pump is not as useful as hand expression  30 ml medicine cups provided for hand expression  Discussed risks for early supplementation: over feeding, longer digestion times, engorgement for mom, lower milk supply for mom, and nipple confusion  Benefits of breast feeding for infant's intestinal tract, less engorgement for mom, protection from multiple disease processes as infant develops, avoidance of over feeding for infant, less nipple confusion, and increased health benefits for mom  Encouraged parents to call for assistance, questions, and concerns about breastfeeding  Extension provided

## 2022-07-01 NOTE — LACTATION NOTE
This note was copied from a baby's chart  CONSULT - LACTATION  Baby Boy (Rometta Or) Shantel Guzmán 1 days male MRN: [id number]    [address] [facility] NURSERY Room / Bed: L&D 311(N)/L&D 311(N) Encounter: [id number]    Maternal Information     MOTHER:  Lowell Cosby  Maternal Age: 29 y o    OB History: # 1 - Date: 2021, Sex: None, Weight: None, GA: 5w0d, Delivery: Spontaneous , Apgar1: None, Apgar5: None, Living: None, Birth Comments: biochemical preg    # 2 - Date: 22, Sex: Male, Weight: 3770 g (8 lb 5 oz), GA: 39w1d, Delivery: , Low Transverse, Apgar1: 9, Apgar5: 9, Living: Living, Birth Comments: None   Previouse breast reduction surgery? No    Lactation history:   Has patient previously breast fed: No   How long had patient previously breast fed:     Previous breast feeding complications:       Past Surgical History:   Procedure Laterality Date    [place]  DELIVERY ONLY N/A 2022    Procedure:  SECTION (); Surgeon: Tiffanie Taylor MD;  Location: [facility];  Service: Obstetrics    WISDOM TOOTH EXTRACTION          Birth information:  YOB: 2020   Time of birth: 8:17 AM   Sex: male   Delivery type: , Low Transverse   Birth Weight: 3770 g (8 lb 5 oz)   Percent of Weight Change: -3%     Gestational Age: 36w3d   [unfilled]    Assessment     Breast and nipple assessment: flat nipple     Assessment: sleepy    Feeding assessment: no latch         Feeding recommendations:  breast feed on demand     RSB reviewed  D/C booklet at bedside for review at later encounter  Massiel Crawford not latching well in the immediate post partum period  Hand expression is very effective  Information on hand expression given  Discussed benefits of knowing how to manually express breast including stimulating milk supply, softening nipple for latch and evacuating breast in the event of engorgement      Worked on positioning infant up at chest level and starting to feed infant with nose arriving at the nipple  Then, using areolar compression to achieve a deep latch that is comfortable and exchanges optimum amounts of milk  Met with mother  Provided mother with Ready, Set, Baby booklet  Discussed Skin to Skin contact an benefits to mom and baby  Talked about the delay of the first bath until baby has adjusted  Spoke about the benefits of rooming in  Feeding on cue and what that means for recognizing infant's hunger  Avoidance of pacifiers for the first month discussed  Talked about exclusive breastfeeding for the first 6 months  Positioning and latch reviewed as well as showing images of other feeding positions  Discussed the properties of a good latch in any position  Reviewed hand/manual expression  Discussed s/s that baby is getting enough milk and some s/s that breastfeeding dyad may need further help  Gave information on common concerns, what to expect the first few weeks after delivery, preparing for other caregivers, and how partners can help  Resources for support also provided  Encouraged parents to call for assistance, questions, and concerns about breastfeeding  Extension provided        Thalia Duran RN 7/1/2022 8:35 AM

## 2022-07-01 NOTE — ASSESSMENT & PLAN NOTE
Improving   Most recent 91K up from [address] stable  DVT chemoprophylaxis held; encourage ambulation

## 2022-07-02 VITALS
RESPIRATION RATE: 18 BRPM | TEMPERATURE: 97.8 F | BODY MASS INDEX: 31.43 KG/M2 | SYSTOLIC BLOOD PRESSURE: 110 MMHG | HEART RATE: 88 BPM | WEIGHT: 170.8 LBS | DIASTOLIC BLOOD PRESSURE: 70 MMHG | HEIGHT: 62 IN | OXYGEN SATURATION: 98 %

## 2022-07-02 PROBLEM — O13.9 GESTATIONAL HYPERTENSION: Status: ACTIVE | Noted: 2022-07-02

## 2022-07-02 LAB
ERYTHROCYTE [DISTWIDTH] IN BLOOD BY AUTOMATED COUNT: 15 % (ref 11.6–15.1)
HCT VFR BLD AUTO: 32.6 % (ref 34.8–46.1)
HGB BLD-MCNC: 10.6 G/DL (ref 11.5–15.4)
MCH RBC QN AUTO: 30.3 PG (ref 26.8–34.3)
MCHC RBC AUTO-ENTMCNC: 32.5 G/DL (ref 31.4–37.4)
MCV RBC AUTO: 93 FL (ref 82–98)
PLATELET # BLD AUTO: 91 THOUSANDS/UL (ref 149–390)
PMV BLD AUTO: 13.1 FL (ref 8.9–12.7)
RBC # BLD AUTO: 3.5 MILLION/UL (ref 3.81–5.12)
WBC # BLD AUTO: 10.8 THOUSAND/UL (ref 4.31–10.16)

## 2022-07-02 PROCEDURE — 85027 COMPLETE CBC AUTOMATED: CPT

## 2022-07-02 PROCEDURE — 99024 POSTOP FOLLOW-UP VISIT: CPT | Performed by: OBSTETRICS & GYNECOLOGY

## 2022-07-02 RX ORDER — OXYCODONE HYDROCHLORIDE 5 MG/1
5 TABLET ORAL EVERY 4 HOURS PRN
Qty: 10 TABLET | Refills: 0 | Status: SHIPPED | OUTPATIENT
Start: 2022-07-02 | End: 2022-07-12

## 2022-07-02 RX ORDER — DOCUSATE SODIUM 100 MG/1
100 CAPSULE, LIQUID FILLED ORAL 2 TIMES DAILY
Refills: 0
Start: 2022-07-02

## 2022-07-02 RX ORDER — ACETAMINOPHEN 325 MG/1
650 TABLET ORAL EVERY 6 HOURS PRN
Refills: 0
Start: 2022-07-02

## 2022-07-02 RX ADMIN — IBUPROFEN 600 MG: 600 TABLET, FILM COATED ORAL at 08:49

## 2022-07-02 RX ADMIN — ACETAMINOPHEN 650 MG: 325 TABLET, FILM COATED ORAL at 06:14

## 2022-07-02 RX ADMIN — ACETAMINOPHEN 650 MG: 325 TABLET, FILM COATED ORAL at 00:24

## 2022-07-02 RX ADMIN — DOCUSATE SODIUM 100 MG: 100 CAPSULE, LIQUID FILLED ORAL at 08:50

## 2022-07-02 RX ADMIN — ACETAMINOPHEN 650 MG: 325 TABLET, FILM COATED ORAL at 12:20

## 2022-07-02 NOTE — PROGRESS NOTES
Obstetrics Progress Note  Jennyfer Jacobsen 29 y o  female MRN: [id number]  Unit/Bed#: L&D 311-01 Encounter: [id number]    Assessment/Plan:  Postoperative day #2 s/p primary low transverse  delivery  Stable  Baby in room  By issue:    Fever during puerperium  Assessment & Plan  Isolated fever 100 4 on  at 2310  Repeat temperature normal without antipyretics   Continue to monitor      Gestational hypertension  Assessment & Plan  Diagnosed intrapartum  No elevated blood pressures last 24h  No symptoms of preeclampsia  Continue to monitor       Thrombocytopenia complicating pregnancy [facility])  Assessment & Plan  Improving  Most recent 91K up from [address] stable  DVT chemoprophylaxis held; encourage ambulation    * Status post primary low transverse  section  Assessment & Plan  Meeting milestones  Continue routine care    Anticipate discharge today if  is cleared  Subjective/Objective   Chief Complaint:   Post delivery  Subjective:   Pain: controlled  Tolerating PO: yes  Voiding: yes  Flatus: yes  Bowel Movement: not yet  Ambulating: yes  Chest pain: no  Shortness of breath: no  Leg pain: no  Lochia: within normal limits  Infant feeding: breast and formula  Objective:   Vitals:   Temp:  [97 9 °F (36 6 °C)-100 4 °F (38 °C)] 98 °F (36 7 °C)  HR:  [72-94] 72  Resp:  [18-20] 20  BP: ()/(55-71) 117/63     Intake/Output Summary (Last 24 hours) at 2022 0748  Last data filed at 2022 1486  Gross per 24 hour   Intake --   Output 1225 ml   Net -1225 ml       Physical Exam:   -General: alert and oriented x 3, in no apparent distress  -Pulmonary: normal effort  -Abdomen/Pelvis: soft, non-tender, non-distended, no rebound or guarding  Uterine fundus firm and non-tender, at the umbilicus   Incision: clean, dry and intact  -Extremities: Non-tender, bilateral pedal edema    Lab Results   Component Value Date    WBC 10 80 (H) 2022    HGB 10 6 (L) 2022    HCT 32 6 (L) 2022 MCV 93 07/02/2022    PLT 91 (L) 07/02/2022         Shu Desouza MD  PGY-II, OBGYN  7/2/2022, 7:48 AM

## 2022-07-02 NOTE — PLAN OF CARE

## 2022-07-02 NOTE — ASSESSMENT & PLAN NOTE
Diagnosed intrapartum  No elevated blood pressures last 24h  No symptoms of preeclampsia  Continue to monitor

## 2022-07-02 NOTE — ASSESSMENT & PLAN NOTE
Isolated fever 100 4 on 7/1 at 2310  Repeat temperature normal without antipyretics   Continue to monitor

## 2022-07-02 NOTE — PLAN OF CARE
Problem: POSTPARTUM  Goal: Experiences normal postpartum course  Description: INTERVENTIONS:  - Monitor maternal vital signs  - Assess uterine involution and lochia  Outcome: Progressing  Goal: Appropriate maternal -  bonding  Description: INTERVENTIONS:  - Identify family support  - Assess for appropriate maternal/infant bonding   -Encourage maternal/infant bonding opportunities  - Referral to  or  as needed  Outcome: Progressing  Goal: Establishment of infant feeding pattern  Description: INTERVENTIONS:  - Assess breast/bottle feeding  - Refer to lactation as needed  Outcome: Progressing  Goal: Incision(s), wounds(s) or drain site(s) healing without S/S of infection  Description: INTERVENTIONS  - Assess and document dressing, incision, wound bed, drain sites and surrounding tissue  - Provide patient and family education  - Perform skin care  Outcome: Progressing     Problem: PAIN - ADULT  Goal: Verbalizes/displays adequate comfort level or baseline comfort level  Description: Interventions:  - Encourage patient to monitor pain and request assistance  - Assess pain using appropriate pain scale  - Administer analgesics based on type and severity of pain and evaluate response  - Implement non-pharmacological measures as appropriate and evaluate response  - Consider cultural and social influences on pain and pain management  - Notify physician/advanced practitioner if interventions unsuccessful or patient reports new pain  Outcome: Progressing     Problem: INFECTION - ADULT  Goal: Absence or prevention of progression during hospitalization  Description: INTERVENTIONS:  - Assess and monitor for signs and symptoms of infection  - Monitor lab/diagnostic results  - Monitor all insertion sites, i e  indwelling lines, tubes, and drains  - Monitor endotracheal if appropriate and nasal secretions for changes in amount and color  - [place] appropriate cooling/warming therapies per order  - Administer medications as ordered  - Instruct and encourage patient and family to use good hand hygiene technique  - Identify and instruct in appropriate isolation precautions for identified infection/condition  Outcome: Progressing  Goal: Absence of fever/infection during neutropenic period  Description: INTERVENTIONS:  - Monitor WBC    Outcome: Progressing     Problem: SAFETY ADULT  Goal: Patient will remain free of falls  Description: INTERVENTIONS:  - Educate patient/family on patient safety including physical limitations  - Instruct patient to call for assistance with activity   - Consult OT/PT to assist with strengthening/mobility   - Keep Call bell within reach  - Keep bed low and locked with side rails adjusted as appropriate  - Keep care items and personal belongings within reach  - Initiate and maintain comfort rounds  - Make Fall Risk Sign visible to staff  - Offer Toileting every  Hours, in advance of need  - Initiate/Maintainalarm  - Obtain necessary fall risk management equipment:   - Apply yellow socks and bracelet for high fall risk patients  - Consider moving patient to room near nurses station  Outcome: Progressing  Goal: Maintain or return to baseline ADL function  Description: INTERVENTIONS:  -  Assess patient's ability to carry out ADLs; assess patient's baseline for ADL function and identify physical deficits which impact ability to perform ADLs (bathing, care of mouth/teeth, toileting, grooming, dressing, etc )  - Assess/evaluate cause of self-care deficits   - Assess range of motion  - Assess patient's mobility; develop plan if impaired  - Assess patient's need for assistive devices and provide as appropriate  - Encourage maximum independence but intervene and supervise when necessary  - Involve family in performance of ADLs  - Assess for home care needs following discharge   - Consider OT consult to assist with ADL evaluation and planning for discharge  - Provide patient education as appropriate  Outcome: Progressing  Goal: Maintains/Returns to pre admission functional level  Description: INTERVENTIONS:  - Perform BMAT or MOVE assessment daily    - Set and communicate daily mobility goal to care team and patient/family/caregiver  - Collaborate with rehabilitation services on mobility goals if consulted  - Perform Range of Motion  times a day  - Reposition patient every  hours  - Dangle patient  times a day  - Stand patient  times a day  - Ambulate patient  times a day  - Out of bed to chair  times a day   - Out of bed for meals times a day  - Out of bed for toileting  - Record patient progress and toleration of activity level   Outcome: Progressing     Problem: Knowledge Deficit  Goal: Patient/family/caregiver demonstrates understanding of disease process, treatment plan, medications, and discharge instructions  Description: Complete learning assessment and assess knowledge base    Interventions:  - Provide teaching at level of understanding  - Provide teaching via preferred learning methods  Outcome: Progressing     Problem: DISCHARGE PLANNING  Goal: Discharge to home or other facility with appropriate resources  Description: INTERVENTIONS:  - Identify barriers to discharge w/patient and caregiver  - Arrange for needed discharge resources and transportation as appropriate  - Identify discharge learning needs (meds, wound care, etc )  - Arrange for interpretive services to assist at discharge as needed  - Refer to Case Management Department for coordinating discharge planning if the patient needs post-hospital services based on physician/advanced practitioner order or complex needs related to functional status, cognitive ability, or social support system  Outcome: Progressing

## 2022-07-08 LAB — PLACENTA IN STORAGE: NORMAL

## 2022-07-14 ENCOUNTER — OFFICE VISIT (OUTPATIENT)
Dept: OBGYN CLINIC | Facility: MEDICAL CENTER | Age: 32
End: 2022-07-14

## 2022-07-14 VITALS
BODY MASS INDEX: 28.89 KG/M2 | DIASTOLIC BLOOD PRESSURE: 70 MMHG | WEIGHT: 157 LBS | SYSTOLIC BLOOD PRESSURE: 100 MMHG | HEIGHT: 62 IN

## 2022-07-14 DIAGNOSIS — Z98.891 STATUS POST PRIMARY LOW TRANSVERSE CESAREAN SECTION: Primary | ICD-10-CM

## 2022-07-14 PROCEDURE — 99024 POSTOP FOLLOW-UP VISIT: CPT | Performed by: STUDENT IN AN ORGANIZED HEALTH CARE EDUCATION/TRAINING PROGRAM

## 2022-07-14 NOTE — ASSESSMENT & PLAN NOTE
- Normal incision check  - Contraception: Declines  - Depression Screen: 2  - Feeding: breastfeeding  - Psychosocial support: reports adequate support  - Patient Education: Postpartum resources including Pelvic Health PT and Baby & Me  - Return for routine postpartum care

## 2022-07-14 NOTE — PROGRESS NOTES
[facility] [address] [place] [address]    Assessment/Plan:  Bethany Trevino is a 29 y o  [id number] who presents for routine postop check after primary  delivery  Status post primary low transverse  section  - Normal incision check  - Contraception: Declines  - Depression Screen: 2  - Feeding: breastfeeding  - Psychosocial support: reports adequate support  - Patient Education: Postpartum resources including Pelvic Health PT and Baby & Me  - Return for routine postpartum care      Diagnoses and all orders for this visit:    Status post primary low transverse  section          Subjective:   Bethany Trevino is a 29 y o   female  CC: Postop check    HPI: Eugenia Milan presents for postop check two weeks after  delivery  She has no concerns and is meeting all postop milestones  Hospital course from Discharge Summary:  Admitted at 39w1d for elective induction of labor  She underwent cervical ripening with Cytotec and a Olvera balloon and then received an epidural   After epidural placement, recurrent late decelerations were noted on the fetal heart tracing so she was ruptured for placement of an FSE and IUPC  Due to persistent decelerations terbutaline was given as well with return of the fetal heart tracing to category 1  Pitocin was started and she made some cervical change however an amnioinfusion infusion was started for recurrent variable decelerations  After 6 hours at 6/80/-1 with intermittently adequate contractions, patient met criteria for arrest of dilation and decision was made to proceed with primary  section  Of note, patient also met criteria for gestational hypertension during her labor course  There was no clinical or laboratory evidence of preeclampsia  She underwent an uncomplicated  delivery  She delivered a viable male  at 18 on 2022   Weight was 8lbs 5oz with APGARs of 9 and 9 at 1 and 5 minutes  Her preoperative hemoglobin was 12 8g/dL  Her postoperative hemoglobin was 10 6g/dL  Thrombocytopenia was stable postoperative  ROS: Review of Systems   Constitutional: Negative for chills and fever  Respiratory: Negative for cough and shortness of breath  Cardiovascular: Negative for chest pain and leg swelling  Gastrointestinal: Negative for abdominal pain, nausea and vomiting  Genitourinary: Negative for dysuria, frequency and urgency  Neurological: Negative for dizziness, light-headedness and headaches  PFSH: The following portions of the patient's history were reviewed and updated as appropriate: allergies, current medications, past family history, past medical history, obstetric history, gynecologic history, past social history, past surgical history and problem list        Objective:  /70 (BP Location: Left arm, Patient Position: Sitting, Cuff Size: Adult)   Ht 5' 2" (1 575 m)   Wt 71 2 kg (157 lb)   LMP 09/29/2021   Breastfeeding Yes   BMI 28 72 kg/m²    Physical Exam  Constitutional:       Appearance: Normal appearance  HENT:      Head: Normocephalic and atraumatic  Mouth/Throat:      Mouth: Mucous membranes are moist       Pharynx: Oropharynx is clear  No oropharyngeal exudate  Cardiovascular:      Rate and Rhythm: Normal rate  Pulmonary:      Effort: Pulmonary effort is normal    Abdominal:      General: Abdomen is flat  There is no distension  Palpations: Abdomen is soft  There is no mass  Tenderness: There is no abdominal tenderness  Hernia: No hernia is present  Comments: Incision is clean, dry, intact, and well healing  There is no erythema, induration, fluctuance, or drainage  Skin:     General: Skin is warm and dry  Neurological:      General: No focal deficit present  Mental Status: She is alert and oriented to person, place, and time     Psychiatric:         Mood and Affect: Mood normal          Behavior: Behavior normal            Chirag Wasserman MD  [address] [place] [address]  7/14/2022 8:45 AM

## 2022-08-18 ENCOUNTER — POSTPARTUM VISIT (OUTPATIENT)
Dept: OBGYN CLINIC | Facility: MEDICAL CENTER | Age: 32
End: 2022-08-18

## 2022-08-18 VITALS
DIASTOLIC BLOOD PRESSURE: 62 MMHG | BODY MASS INDEX: 28.71 KG/M2 | WEIGHT: 156 LBS | SYSTOLIC BLOOD PRESSURE: 108 MMHG | HEIGHT: 62 IN

## 2022-08-18 DIAGNOSIS — Z98.891 STATUS POST PRIMARY LOW TRANSVERSE CESAREAN SECTION: Primary | ICD-10-CM

## 2022-08-18 PROCEDURE — 99024 POSTOP FOLLOW-UP VISIT: CPT | Performed by: STUDENT IN AN ORGANIZED HEALTH CARE EDUCATION/TRAINING PROGRAM

## 2022-08-18 NOTE — PATIENT INSTRUCTIONS
Dear Kristopher Mello,    It was a pleasure taking care of you today  In summary, today we completed your postpartum visit  I appreciate you and welcome your feedback  If you receive a survey from [facility], please take a few moments to let me know how I am doing  Sincerely,  Dr Sienna Zhang  [phone]    Postpartum [facility]:  Baby and Me is a support center designed to promote the physical and mental health of families  [address] delivers personalized and compassionate care that includes prenatal and  classes, Support Groups, Lactation Support Services, Post-Partum Emotional Wellness Services, Activities/Exercise    [address]  [address] [place] [address], [facility]  [phone]  [email or web address]    [address] [facility] for Pelvic Health  Physical Therapy at [facility] team of trained female therapists offer a wide variety of services designed to address the special healthcare needs of women  Our specially trained clinicians work with you to address your concerns and come beside you to support and encourage you through the healing process  Our offices are designed to keep your sensitive treatments private at all times  We are here to ensure your comfort and mentor you through our pelvic floor therapy programs at your pace  Chetna hoff    Other Resources for Postpartum [address] [employer]  [facility]

## 2022-08-18 NOTE — ASSESSMENT & PLAN NOTE
- Normal incision check, vaginal itching - exam negative for vaginitis; suspect atrophic changes from lactational/postpartum estrogen suppression, discussed anticipatory guidance if continuous  - Contraception: Condoms  - Depression Screen: negative  - Feeding: breastfeeding  - Psychosocial support: reports adequate support  - Patient Education: Postpartum resources including Pelvic Health PT and Baby & Me

## 2022-08-18 NOTE — PROGRESS NOTES
[facility] [address] [place] [address]    Assessment/Plan:  Ana Dunn is a 29 y o  [id number] who presents for routine postpartum care  Status post primary low transverse  section  - Normal incision check, vaginal itching - exam negative for vaginitis; suspect atrophic changes from lactational/postpartum estrogen suppression, discussed anticipatory guidance if continuous  - Contraception: Condoms  - Depression Screen: negative  - Feeding: breastfeeding  - Psychosocial support: reports adequate support  - Patient Education: Postpartum resources including Pelvic Health PT and Baby & Me      Diagnoses and all orders for this visit:    Status post primary low transverse  section          Subjective:   Ana Dunn is a 29 y o   female  CC: postpartum visit    HPI: Yvonne Boyd presents for postop check 6 weeks after  delivery  She is breastfeeding  She is using condoms for contraception  She reports 2 days of vaginal itching/irritation without discharge  Hospital course from Discharge Summary:  Admitted at 39w1d for elective induction of labor  She underwent cervical ripening with Cytotec and a Olvera balloon and then received an epidural   After epidural placement, recurrent late decelerations were noted on the fetal heart tracing so she was ruptured for placement of an FSE and IUPC  Due to persistent decelerations terbutaline was given as well with return of the fetal heart tracing to category 1  Pitocin was started and she made some cervical change however an amnioinfusion infusion was started for recurrent variable decelerations  After 6 hours at 6/80/-1 with intermittently adequate contractions, patient met criteria for arrest of dilation and decision was made to proceed with primary  section  Of note, patient also met criteria for gestational hypertension during her labor course   There was no clinical or laboratory evidence of preeclampsia  She underwent an uncomplicated  delivery  She delivered a viable male  at 18 on 2022  Weight was 8lbs 5oz with APGARs of 9 and 9 at 1 and 5 minutes  Her preoperative hemoglobin was 12 8g/dL  Her postoperative hemoglobin was 10 6g/dL  Thrombocytopenia was stable postoperative  ROS: Review of Systems   Constitutional: Negative for chills and fever  Respiratory: Negative for cough and shortness of breath  Cardiovascular: Negative for chest pain and leg swelling  Gastrointestinal: Negative for abdominal pain, nausea and vomiting  Genitourinary: Negative for dysuria, frequency and urgency  Neurological: Negative for dizziness, light-headedness and headaches  PFSH: The following portions of the patient's history were reviewed and updated as appropriate: allergies, current medications, past family history, past medical history, obstetric history, gynecologic history, past social history, past surgical history and problem list        Objective:  /62   Ht 5' 2" (1 575 m)   Wt 70 8 kg (156 lb)   LMP 2021   BMI 28 53 kg/m²    Physical Exam  Constitutional:       Appearance: Normal appearance  HENT:      Head: Normocephalic and atraumatic  Mouth/Throat:      Mouth: Mucous membranes are moist       Pharynx: Oropharynx is clear  No oropharyngeal exudate  Cardiovascular:      Rate and Rhythm: Normal rate  Pulmonary:      Effort: Pulmonary effort is normal    Abdominal:      General: Abdomen is flat  There is no distension  Palpations: Abdomen is soft  There is no mass  Tenderness: There is no abdominal tenderness  There is no guarding  Hernia: No hernia is present  Comments: Incision is clean, dry, intact, and well healing  There is no erythema, induration, fluctuance, or drainage  Genitourinary:     Exam position: Lithotomy position  Pubic Area: No rash         Labia:         Right: No rash, tenderness or lesion  Left: No rash, tenderness or lesion  Urethra: No prolapse, urethral swelling or urethral lesion  Vagina: No vaginal discharge, erythema, tenderness, bleeding or lesions  Cervix: No cervical motion tenderness, discharge, friability or erythema  Comments: Atrophic vaginal mucosa  Lymphadenopathy:      Lower Body: No right inguinal adenopathy  No left inguinal adenopathy  Skin:     General: Skin is warm and dry  Neurological:      General: No focal deficit present  Mental Status: She is alert and oriented to person, place, and time     Psychiatric:         Mood and Affect: Mood normal          Behavior: Behavior normal            Antionette Figueroa MD  [address] [place] [address]  8/18/2022 9:49 AM

## 2022-09-06 ENCOUNTER — OFFICE VISIT (OUTPATIENT)
Dept: FAMILY MEDICINE CLINIC | Facility: CLINIC | Age: 32
End: 2022-09-06
Payer: COMMERCIAL

## 2022-09-06 VITALS
BODY MASS INDEX: 26.82 KG/M2 | TEMPERATURE: 97.3 F | RESPIRATION RATE: 16 BRPM | SYSTOLIC BLOOD PRESSURE: 98 MMHG | WEIGHT: 151.4 LBS | HEIGHT: 63 IN | HEART RATE: 61 BPM | OXYGEN SATURATION: 98 % | DIASTOLIC BLOOD PRESSURE: 60 MMHG

## 2022-09-06 DIAGNOSIS — Z00.00 ANNUAL PHYSICAL EXAM: Primary | ICD-10-CM

## 2022-09-06 DIAGNOSIS — Z23 ENCOUNTER FOR IMMUNIZATION: ICD-10-CM

## 2022-09-06 PROCEDURE — 99395 PREV VISIT EST AGE 18-39: CPT | Performed by: FAMILY MEDICINE

## 2022-09-06 PROCEDURE — 90686 IIV4 VACC NO PRSV 0.5 ML IM: CPT

## 2022-09-06 PROCEDURE — 90471 IMMUNIZATION ADMIN: CPT

## 2022-09-06 RX ORDER — CETIRIZINE HYDROCHLORIDE 10 MG/1
10 TABLET ORAL DAILY
COMMUNITY

## 2022-09-06 RX ORDER — FLUTICASONE PROPIONATE 50 MCG
1 SPRAY, SUSPENSION (ML) NASAL DAILY
COMMUNITY

## 2022-09-06 NOTE — PROGRESS NOTES
ADULT ANNUAL [address]  PRIMARY CARE    NAME: Roderick Escobar  AGE: 29 y o  SEX: female  : 1990     DATE: 2022     Assessment and Plan:   Yaya Oropeza was seen today for well check  Diagnoses and all orders for this visit:    Annual physical exam    Encounter for immunization  -     influenza vaccine, quadrivalent, 0 5 mL, preservative-free, for adult and pediatric patients 6 mos+ (AFLURIA, FLUARIX, FLULAVAL, FLUZONE)      Problem List Items Addressed This Visit    None     Visit Diagnoses     Annual physical exam    -  Primary    Encounter for immunization        Relevant Orders    influenza vaccine, quadrivalent, 0 5 mL, preservative-free, for adult and pediatric patients 6 mos+ (AFLURIA, FLUARIX, FLULAVAL, FLUZONE) (Completed)          Immunizations and preventive care screenings were discussed with patient today  Appropriate education was printed on patient's after visit summary  Counseling:  Alcohol/drug use: discussed moderation in alcohol intake, the recommendations for healthy alcohol use  Dental Health: discussed importance of regular tooth brushing, flossing, and dental visits  Injury prevention: discussed safety/seat belts, safety helmets, smoke detectors, carbon dioxide detectors, and smoking near bedding or upholstery  · Sexual health: no issues, post pregnancy  · Exercise: the importance of regular exercise/physical activity was discussed  Recommend exercise 3-5 times per week for at least 30 minutes  BMI Counseling: Body mass index is 27 16 kg/m²   The BMI is above normal  Nutrition recommendations include decreasing portion sizes, encouraging healthy choices of fruits and vegetables, decreasing fast food intake, consuming healthier snacks, limiting drinks that contain sugar, moderation in carbohydrate intake, increasing intake of lean protein, reducing intake of saturated and trans fat and reducing intake of cholesterol  Exercise recommendations include exercising 3-5 times per week  Patient referred to PCP  Rationale for BMI follow-up plan is due to patient being overweight or obese  Return in about 1 year (around 2023) for Annual physical      Chief Complaint:     Chief Complaint   Patient presents with    Well Check     Pt did not receive Covid Booster       History of Present Illness:     Adult Annual Physical   Patient here for a comprehensive physical exam  The patient reports no problems  Pre-pregnancy weight 135#  Diet and Physical Activity  · Diet/Nutrition: well balanced diet  · Exercise: walking  Depression Screening  PHQ-2/9 Depression Screening         General Health  · Sleep: sleeps well  · Hearing: normal - bilateral   · Vision: most recent eye exam <1 year ago  · Dental: no dental visits for >1 year  /GYN Health  · Last menstrual period: post =partum no bleed  · Contraceptive method: barrier methods  · History of STDs?: no   OB History        2    Para   1    Term   1            AB   1    Living   1       SAB   1    IAB        Ectopic        Multiple   0    Live Births   1                Review of Systems:     Review of Systems   Constitutional: Negative for unexpected weight change (Trying to get back to pre pregnancy weight--- 135#)  HENT: Negative for dental problem  Eyes: Negative for visual disturbance  Respiratory: Negative for shortness of breath  Cardiovascular: Negative for leg swelling  Gastrointestinal: Negative  Musculoskeletal: Negative  Psychiatric/Behavioral: Negative  Past Medical History:     Past Medical History:   Diagnosis Date    Gestational hypertension 2022    Known health problems: none     Varicella     chicken pox      Past Surgical History:     Past Surgical History:   Procedure Laterality Date    NM  DELIVERY ONLY N/A 2022    Procedure:  SECTION ();   Surgeon: Vanda Quesada Diogo Michelle MD;  [facility];  Service: Obstetrics    WISDOM TOOTH EXTRACTION        Social History:     Social History     Socioeconomic History    Marital status: /Civil Union     Spouse name: None    Number of children: None    Years of education: None    Highest education level: None   Occupational History    None   Tobacco Use    Smoking status: Never Smoker    Smokeless tobacco: Never Used   Vaping Use    Vaping Use: Never used   Substance and Sexual Activity    Alcohol use: Not Currently    Drug use: Never    Sexual activity: Yes     Partners: Male     Birth control/protection: None   Other Topics Concern    None   Social History Narrative    None     Social Determinants of Health     Financial Resource Strain: Not on file   Food Insecurity: Not on file   Transportation Needs: Not on file   Physical Activity: Not on file   Stress: Not on file   Social Connections: Not on file   Intimate Partner Violence: Not on file   Housing Stability: Not on file      Family History:     Family History   Problem Relation Age of Onset    Depression Mother     Bipolar disorder Mother     Seizures Mother     Hypertension Father     Heart disease Father     Heart attack Father     Colon cancer Maternal Grandfather     Cancer Paternal Grandfather     Kidney cancer Paternal Grandfather     Stroke Paternal Grandfather     Heart attack Paternal Grandfather     No Known Problems Brother     Diabetes Maternal Grandmother     Hypertension Maternal Grandmother     Anxiety disorder Maternal Grandmother     Depression Maternal Grandmother     Heart attack Paternal Grandmother     No Known Problems Half-Brother     Anxiety disorder Half-Brother     Depression Half-Brother     Breast cancer Neg Hx     Ovarian cancer Neg Hx       Current Medications:     Current Outpatient Medications   Medication Sig Dispense Refill    cetirizine (ZyrTEC) 10 mg tablet Take 10 mg by mouth daily      fluticasone (FLONASE) 50 mcg/act nasal spray 1 spray into each nostril daily      Prenatal Vit-Fe Fumarate-FA (PRENATAL VITAMINS PO) Take by mouth      acetaminophen (TYLENOL) 325 mg tablet Take 2 tablets (650 mg total) by mouth every 6 (six) hours as needed for mild pain (Patient not taking: No sig reported)  0    docusate sodium (COLACE) 100 mg capsule Take 1 capsule (100 mg total) by mouth 2 (two) times a day (Patient not taking: No sig reported)  0     No current facility-administered medications for this visit  Allergies: Allergies   Allergen Reactions    Latex Hives      Physical Exam:     BP 98/60   Pulse 61   Temp (!) 97 3 °F (36 3 °C) (Temporal)   Resp 16   Ht 5' 2 6" (1 59 m)   Wt 68 7 kg (151 lb 6 4 oz)   SpO2 98%   BMI 27 16 kg/m²     Physical Exam  Vitals and nursing note reviewed  Constitutional:       General: She is not in acute distress  Appearance: Normal appearance  She is well-developed  HENT:      Head: Normocephalic and atraumatic  Right Ear: Tympanic membrane normal       Left Ear: Tympanic membrane normal       Nose: Nose normal       Mouth/Throat:      Mouth: Mucous membranes are moist    Eyes:      Conjunctiva/sclera: Conjunctivae normal    Cardiovascular:      Rate and Rhythm: Normal rate and regular rhythm  Heart sounds: No murmur heard  Pulmonary:      Effort: Pulmonary effort is normal  No respiratory distress  Breath sounds: Normal breath sounds  Abdominal:      Palpations: Abdomen is soft  Tenderness: There is no abdominal tenderness  Musculoskeletal:         General: Normal range of motion  Cervical back: Neck supple  Skin:     General: Skin is warm and dry  Neurological:      Mental Status: She is alert and oriented to person, place, and time  Psychiatric:         Mood and Affect: Mood normal          Behavior: Behavior normal          Thought Content:  Thought content normal          Judgment: Judgment normal           Irene Feil DO Lauren   [facility]

## 2022-09-06 NOTE — PATIENT INSTRUCTIONS

## 2023-02-02 ENCOUNTER — TELEPHONE (OUTPATIENT)
Dept: HEMATOLOGY ONCOLOGY | Facility: CLINIC | Age: 33
End: 2023-02-02

## 2023-04-27 ENCOUNTER — OFFICE VISIT (OUTPATIENT)
Dept: FAMILY MEDICINE CLINIC | Facility: CLINIC | Age: 33
End: 2023-04-27

## 2023-04-27 DIAGNOSIS — S56.419A STRAIN OF EXTENSOR DIGITORUM TENDON: Primary | ICD-10-CM

## 2023-04-27 RX ORDER — PREDNISONE 10 MG/1
TABLET ORAL
Qty: 21 EACH | Refills: 0 | Status: SHIPPED | OUTPATIENT
Start: 2023-04-27 | End: 2023-04-28

## 2023-04-27 RX ORDER — PREDNISONE 10 MG/1
TABLET ORAL
Qty: 21 EACH | Refills: 0 | Status: SHIPPED | OUTPATIENT
Start: 2023-04-27 | End: 2023-04-27 | Stop reason: SDUPTHER

## 2023-04-27 NOTE — PROGRESS NOTES
"Name: Kirk Stephen      : 1990      MRN: [id number]  Encounter Provider: Heather Avery DO  Encounter Date: 2023   Encounter department: [facility]    Assessment & Plan     1  Strain of extensor digitorum tendon-brevis  -     predniSONE 10 mg tablet; Take 6-5-4-3-2-1          Discussed change over to heat, topical anti-inflammatories, and to begin foot and ankle stretches and conditioning as illustrated in [facility] foot and ankle PDF sent through patient's email ,  Follow-up if no improvement  Subjective      45-year-old female presenting with right foot pain as noted  Not sure of mechanism of injury  Did have a fall or trip about 3 4 weeks ago but did not noticed immediate symptoms at that time  There was no bruising no swelling  Chief Complaint   Patient presents with    Pain     Right foot pain x 3-4 weeks, she tripped and fell while walking a few weeks ago but does not re call injuring foot      Review of Systems   Musculoskeletal:        Foot symptoms as noted   All other systems reviewed and are negative      Right outer ---worse with getting up to standing -Worse after shifts  Does 3- 12 hour shifts, stands  Current Outpatient Medications on File Prior to Visit   Medication Sig    cetirizine (ZyrTEC) 10 mg tablet Take 10 mg by mouth daily    fluticasone (FLONASE) 50 mcg/act nasal spray 1 spray into each nostril daily    Prenatal Vit-Fe Fumarate-FA (PRENATAL VITAMINS PO) Take by mouth    acetaminophen (TYLENOL) 325 mg tablet Take 2 tablets (650 mg total) by mouth every 6 (six) hours as needed for mild pain (Patient not taking: Reported on 2022)    docusate sodium (COLACE) 100 mg capsule Take 1 capsule (100 mg total) by mouth 2 (two) times a day (Patient not taking: Reported on 2022)       Objective     /62   Pulse 88   Temp 97 8 °F (36 6 °C) (Temporal)   Ht 5' 3\" (1 6 m)   Wt 61 2 kg (135 lb)   LMP 2023 (Approximate)   SpO2 " 98%   Breastfeeding Yes   BMI 23 91 kg/m²     Physical Exam  Constitutional:       Appearance: Normal appearance  Cardiovascular:      Rate and Rhythm: Normal rate  Pulses: Normal pulses  Pulmonary:      Effort: Pulmonary effort is normal    Musculoskeletal:         General: No swelling  Right lower leg: No edema  Comments: There is some slight tenderness over the extensor brevis tendon and just distal to lateral malleolus and talofibular tendon   Neurological:      Mental Status: She is alert and oriented to person, place, and time  Psychiatric:         Mood and Affect: Mood normal          Behavior: Behavior normal          Thought Content:  Thought content normal          Judgment: Judgment normal        Forestine Appl, DO

## 2023-04-28 ENCOUNTER — TELEPHONE (OUTPATIENT)
Dept: FAMILY MEDICINE CLINIC | Facility: CLINIC | Age: 33
End: 2023-04-28

## 2023-04-28 RX ORDER — PREDNISONE 10 MG/1
TABLET ORAL
Qty: 21 TABLET | Refills: 0 | Status: SHIPPED | OUTPATIENT
Start: 2023-04-28

## 2023-04-28 NOTE — TELEPHONE ENCOUNTER
[facility] called and left a message stating that they do not have the prednisone therapy pack  They are requesting prednisone 10 mg dispense 21 tablets  Please advise   Thanks

## 2023-05-02 VITALS
TEMPERATURE: 97.8 F | HEIGHT: 63 IN | HEART RATE: 88 BPM | WEIGHT: 135 LBS | SYSTOLIC BLOOD PRESSURE: 112 MMHG | BODY MASS INDEX: 23.92 KG/M2 | DIASTOLIC BLOOD PRESSURE: 62 MMHG | OXYGEN SATURATION: 98 %

## 2023-11-15 ENCOUNTER — IMMUNIZATIONS (OUTPATIENT)
Dept: FAMILY MEDICINE CLINIC | Facility: CLINIC | Age: 33
End: 2023-11-15
Payer: COMMERCIAL

## 2023-11-15 DIAGNOSIS — Z23 ENCOUNTER FOR IMMUNIZATION: Primary | ICD-10-CM

## 2023-11-15 PROCEDURE — 90471 IMMUNIZATION ADMIN: CPT

## 2023-11-15 PROCEDURE — 90686 IIV4 VACC NO PRSV 0.5 ML IM: CPT

## 2023-12-13 ENCOUNTER — VBI (OUTPATIENT)
Dept: ADMINISTRATIVE | Facility: OTHER | Age: 33
End: 2023-12-13

## 2024-08-07 ENCOUNTER — OFFICE VISIT (OUTPATIENT)
Dept: FAMILY MEDICINE CLINIC | Facility: CLINIC | Age: 34
End: 2024-08-07
Payer: COMMERCIAL

## 2024-08-07 VITALS
TEMPERATURE: 97.7 F | HEIGHT: 63 IN | OXYGEN SATURATION: 99 % | HEART RATE: 79 BPM | DIASTOLIC BLOOD PRESSURE: 80 MMHG | SYSTOLIC BLOOD PRESSURE: 118 MMHG | WEIGHT: 139 LBS | BODY MASS INDEX: 24.63 KG/M2

## 2024-08-07 DIAGNOSIS — G89.29 CHRONIC PAIN OF LEFT KNEE: ICD-10-CM

## 2024-08-07 DIAGNOSIS — G43.109 MIGRAINE WITH AURA AND WITHOUT STATUS MIGRAINOSUS, NOT INTRACTABLE: Primary | ICD-10-CM

## 2024-08-07 DIAGNOSIS — M25.562 CHRONIC PAIN OF LEFT KNEE: ICD-10-CM

## 2024-08-07 DIAGNOSIS — Z13.31 POSITIVE SCREENING FOR DEPRESSION ON 2-ITEM PATIENT HEALTH QUESTIONNAIRE (PHQ-2): ICD-10-CM

## 2024-08-07 PROCEDURE — 99214 OFFICE O/P EST MOD 30 MIN: CPT | Performed by: NURSE PRACTITIONER

## 2024-08-07 RX ORDER — RIZATRIPTAN BENZOATE 5 MG/1
5 TABLET ORAL AS NEEDED
Qty: 18 TABLET | Refills: 0 | Status: SHIPPED | OUTPATIENT
Start: 2024-08-07

## 2024-08-07 NOTE — PATIENT INSTRUCTIONS
Start maxalt as needed at onset of migraine.   Recommend referral to neurology and imaging due to long standing history and never been done before. MRI ordered to be scheduled.     Complete imaging of left knee- if no acute findings can pursue physical therapy.     Complete labs.     Please call the office if you are experiencing any worsening of symptoms or no symptom improvement.           Recommendations for Headaches:     1. CoQ10/12: 100mg to 200mg daily  2. B12: 1000mcg daily   3. Magnesium oxide: 400-600 mg daily   4. B2 (riboflavin): 400mg per day - may take 200 mg in AM and 200 mg in PM  5. Melatonin: 1-3 mg at bedtime, may increase up to 12 mg as needed for sleep  6. Fish oil: 1-4 grams per day     At onset of headache seek rest in dark quiet space and apply cool compress to head.  Maintain regular sleep schedule, limit over the counter medications (tylenol/advil) to less than 3 times per week, maintain a detailed headache diary, limit caffeine to 1-2 cups per day or less, avoid any dietary triggers, regular frequent meals.   Do not start all supplementation at once, slowly introduce them.

## 2024-08-07 NOTE — PROGRESS NOTES
Ambulatory Visit  Name: Ana Preciado      : 1990      MRN: [id number]  Encounter Provider: PATRICIA Srivastava  Encounter Date: 2024   Encounter department: [facility]    Assessment & Plan   1. Migraine with aura and without status migrainosus, not intractable  -     MRI brain w wo contrast; Future; Expected date: 2024  -     Ambulatory referral to Neurology; Future  -     rizatriptan (MAXALT) 5 mg tablet; Take 1 tablet (5 mg total) by mouth as needed for migraine Take at the onset of migraine; if symptoms continue or return, may take another dose at least 2 hours after first dose. Take no more than 2 doses in a day.  -     Comprehensive metabolic panel; Future  -     CBC and differential; Future  -     TSH, 3rd generation with Free T4 reflex; Future  -     Lipid panel; Future  -     Vitamin D 25 hydroxy; Future  -     Vitamin B12; Future  -     Magnesium; Future  2. Chronic pain of left knee  -     XR knee 4+ vw left injury; Future; Expected date: 2024  3. Positive screening for depression on 2-item Patient Health Questionnaire (PHQ-2)    Migraines without new features. Will Start maxalt as needed at onset of migraine.   Recommend referral to neurology and imaging due to long standing history and never been done before. MRI ordered to be scheduled.   Complete imaging of left knee- if no acute findings can pursue physical therapy.   Phq-9 reviewed-therapy encouraged. Some symptoms related to headaches. Call if any changes- did discuss with patient that there are some medications that could help with headache prevention and anxiety/depression.   Complete labs.   Please call the office if you are experiencing any worsening of symptoms or no symptom improvement.   Recommendations for Headaches:   1. CoQ10/12: 100mg to 200mg daily  2. B12: 1000mcg daily   3. Magnesium oxide: 400-600 mg daily   4. B2 (riboflavin): 400mg per day - may take 200 mg in AM and  "200 mg in PM  5. Melatonin: 1-3 mg at bedtime, may increase up to 12 mg as needed for sleep  6. Fish oil: 1-4 grams per day   At onset of headache seek rest in dark quiet space and apply cool compress to head.  Maintain regular sleep schedule, limit over the counter medications (tylenol/advil) to less than 3 times per week, maintain a detailed headache diary, limit caffeine to 1-2 cups per day or less, avoid any dietary triggers, regular frequent meals.   Do not start all supplementation at once, slowly introduce them.   ER for any worsening symptoms/ red flag symptoms reviewed.       Depression Screening and Follow-up Plan: Patient's depression screening was positive with a PHQ-9 score of 5. Continue regular follow-up with their mental health provider who is managing their mental health condition(s).       History of Present Illness     Here to discuss headaches and knee pain.     Migraines  Has long history of migraines  More recently increasing in severity and frequency. Happens a few times a month lasting 1-3 days. Features are the same.   Uses heat and compression/ pressure points. Needs to usually take 1,000 mg tylenol to get some help. Lasting usually 36-48 hours.   Never saw neurology in the past. Never on prescribed medications for migraines. Has had increased stress.   Sleep is okay. Stays well hydrated. Has had migraines since her teens. Doesn't currently have a migraine at this time.     Knee pain:   Left side. While running [place] years ago got pain behind left knee. Had swelling and couldn't bend it for two weeks. Since then if she's bent / squatting then getting up is hard. Pain in the back with extension. Maybe some mild swelling at times. Didn't pursue any eval/ PT or imaging at that time.     Headache      Review of Systems   Neurological:  Positive for headaches.       Objective     /80   Pulse 79   Temp 97.7 °F (36.5 °C) (Temporal)   Ht 5' 3\" (1.6 m)   Wt 63 kg " (139 lb)   SpO2 99%   Breastfeeding No   BMI 24.62 kg/m²     Physical Exam  Vitals and nursing note reviewed.   Constitutional:       General: She is not in acute distress.     Appearance: Normal appearance. She is well-developed. She is not diaphoretic.   HENT:      Head: Normocephalic and atraumatic.      Right Ear: External ear normal.      Left Ear: External ear normal.   Eyes:      General: Lids are normal. No scleral icterus.        Right eye: No discharge.         Left eye: No discharge.      Extraocular Movements: Extraocular movements intact.      Conjunctiva/sclera: Conjunctivae normal.      Pupils: Pupils are equal, round, and reactive to light.   Cardiovascular:      Rate and Rhythm: Normal rate and regular rhythm.      Heart sounds: No murmur heard.  Pulmonary:      Effort: Pulmonary effort is normal. No respiratory distress.      Breath sounds: Normal breath sounds. No wheezing.   Musculoskeletal:         General: No deformity.   Skin:     General: Skin is warm and dry.   Neurological:      General: No focal deficit present.      Mental Status: She is alert and oriented to person, place, and time.      Cranial Nerves: No cranial nerve deficit.      Sensory: No sensory deficit.      Motor: No weakness.      Coordination: Coordination normal.      Gait: Gait normal.   Psychiatric:         Speech: Speech normal.         Behavior: Behavior normal.         Thought Content: Thought content normal.         Judgment: Judgment normal.       Administrative Statements

## (undated) DEVICE — GLOVE SRG BIOGEL ECLIPSE 7

## (undated) DEVICE — SUT MONOCRYL 4-0 PS-2 27 IN Y426H

## (undated) DEVICE — SCD SEQUENTIAL COMPRESSION COMFORT SLEEVE MEDIUM KNEE LENGTH: Brand: KENDALL SCD

## (undated) DEVICE — SUT VICRYL 0 CT-1 36 IN J946H

## (undated) DEVICE — ABG MICROSTICKS SAFETY

## (undated) DEVICE — GLOVE INDICATOR PI UNDERGLOVE SZ 7 BLUE

## (undated) DEVICE — SUT VICRYL 0 CTX 36 IN J978H

## (undated) DEVICE — PACK C-SECTION PBDS

## (undated) DEVICE — ADHESIVE SKIN HIGH VISCOSITY EXOFIN 1ML

## (undated) DEVICE — CHLORAPREP HI-LITE 26ML ORANGE

## (undated) DEVICE — SUT PLAIN 3-0 CT-1 27 IN 842H